# Patient Record
Sex: FEMALE | Race: WHITE | NOT HISPANIC OR LATINO | Employment: OTHER | URBAN - METROPOLITAN AREA
[De-identification: names, ages, dates, MRNs, and addresses within clinical notes are randomized per-mention and may not be internally consistent; named-entity substitution may affect disease eponyms.]

---

## 2017-03-01 ENCOUNTER — HOSPITAL ENCOUNTER (OUTPATIENT)
Dept: LAB | Facility: CLINIC | Age: 47
Discharge: HOME | End: 2017-03-01
Attending: INTERNAL MEDICINE | Admitting: INTERNAL MEDICINE
Payer: MEDICARE

## 2017-03-01 DIAGNOSIS — G47.00 INSOMNIA, UNSPECIFIED TYPE: ICD-10-CM

## 2017-03-01 DIAGNOSIS — G62.9 NEUROPATHY: ICD-10-CM

## 2017-03-01 DIAGNOSIS — Z85.3 PERSONAL HISTORY OF MALIGNANT NEOPLASM OF BREAST: ICD-10-CM

## 2017-03-01 LAB
ALBUMIN SERPL-MCNC: 3.2 G/DL (ref 3.4–5)
ALP SERPL-CCNC: 100 U/L (ref 40–150)
ALT SERPL W P-5'-P-CCNC: 39 U/L (ref 0–50)
ANION GAP SERPL CALCULATED.3IONS-SCNC: 7 MMOL/L (ref 3–14)
AST SERPL W P-5'-P-CCNC: 25 U/L (ref 0–45)
BASOPHILS # BLD AUTO: 0 10E9/L (ref 0–0.2)
BASOPHILS NFR BLD AUTO: 0.4 %
BILIRUB SERPL-MCNC: 0.3 MG/DL (ref 0.2–1.3)
BUN SERPL-MCNC: 8 MG/DL (ref 7–30)
CALCIUM SERPL-MCNC: 8.4 MG/DL (ref 8.5–10.1)
CANCER AG27-29 SERPL-ACNC: 21 U/ML (ref 0–39)
CHLORIDE SERPL-SCNC: 111 MMOL/L (ref 94–109)
CO2 SERPL-SCNC: 26 MMOL/L (ref 20–32)
CREAT SERPL-MCNC: 0.54 MG/DL (ref 0.52–1.04)
DIFFERENTIAL METHOD BLD: NORMAL
EOSINOPHIL # BLD AUTO: 0.1 10E9/L (ref 0–0.7)
EOSINOPHIL NFR BLD AUTO: 2.2 %
ERYTHROCYTE [DISTWIDTH] IN BLOOD BY AUTOMATED COUNT: 12.8 % (ref 10–15)
GFR SERPL CREATININE-BSD FRML MDRD: ABNORMAL ML/MIN/1.7M2
GLUCOSE SERPL-MCNC: 89 MG/DL (ref 70–99)
HCT VFR BLD AUTO: 38.8 % (ref 35–47)
HGB BLD-MCNC: 12.5 G/DL (ref 11.7–15.7)
IMM GRANULOCYTES # BLD: 0.2 10E9/L (ref 0–0.4)
IMM GRANULOCYTES NFR BLD: 3.3 %
LYMPHOCYTES # BLD AUTO: 0.8 10E9/L (ref 0.8–5.3)
LYMPHOCYTES NFR BLD AUTO: 17.4 %
MCH RBC QN AUTO: 32.1 PG (ref 26.5–33)
MCHC RBC AUTO-ENTMCNC: 32.2 G/DL (ref 31.5–36.5)
MCV RBC AUTO: 100 FL (ref 78–100)
MONOCYTES # BLD AUTO: 0.5 10E9/L (ref 0–1.3)
MONOCYTES NFR BLD AUTO: 10.7 %
NEUTROPHILS # BLD AUTO: 3 10E9/L (ref 1.6–8.3)
NEUTROPHILS NFR BLD AUTO: 66 %
PLATELET # BLD AUTO: 197 10E9/L (ref 150–450)
POTASSIUM SERPL-SCNC: 3.6 MMOL/L (ref 3.4–5.3)
PROT SERPL-MCNC: 6.7 G/DL (ref 6.8–8.8)
RBC # BLD AUTO: 3.89 10E12/L (ref 3.8–5.2)
SODIUM SERPL-SCNC: 144 MMOL/L (ref 133–144)
WBC # BLD AUTO: 4.5 10E9/L (ref 4–11)

## 2017-03-01 PROCEDURE — 80053 COMPREHEN METABOLIC PANEL: CPT | Performed by: INTERNAL MEDICINE

## 2017-03-01 PROCEDURE — 86300 IMMUNOASSAY TUMOR CA 15-3: CPT | Performed by: INTERNAL MEDICINE

## 2017-03-01 PROCEDURE — 85025 COMPLETE CBC W/AUTO DIFF WBC: CPT | Performed by: INTERNAL MEDICINE

## 2017-03-01 PROCEDURE — 36415 COLL VENOUS BLD VENIPUNCTURE: CPT | Performed by: INTERNAL MEDICINE

## 2017-03-01 RX ORDER — IOPAMIDOL 755 MG/ML
63 INJECTION, SOLUTION INTRAVASCULAR ONCE
Status: COMPLETED | OUTPATIENT
Start: 2017-03-02 | End: 2017-03-02

## 2017-03-02 ENCOUNTER — HOSPITAL ENCOUNTER (OUTPATIENT)
Dept: CT IMAGING | Facility: CLINIC | Age: 47
Discharge: HOME | End: 2017-03-02
Attending: INTERNAL MEDICINE | Admitting: INTERNAL MEDICINE
Payer: MEDICARE

## 2017-03-02 DIAGNOSIS — Z85.3 PERSONAL HISTORY OF MALIGNANT NEOPLASM OF BREAST: ICD-10-CM

## 2017-03-02 DIAGNOSIS — G47.00 INSOMNIA, UNSPECIFIED TYPE: ICD-10-CM

## 2017-03-02 DIAGNOSIS — G62.9 NEUROPATHY: ICD-10-CM

## 2017-03-02 PROCEDURE — 25000125 ZZHC RX 250: Performed by: RADIOLOGY

## 2017-03-02 PROCEDURE — 74177 CT ABD & PELVIS W/CONTRAST: CPT

## 2017-03-02 PROCEDURE — 25500064 ZZH RX 255 OP 636: Performed by: RADIOLOGY

## 2017-03-02 PROCEDURE — 71260 CT THORAX DX C+: CPT

## 2017-03-02 RX ADMIN — SODIUM CHLORIDE 56 ML: 9 INJECTION, SOLUTION INTRAVENOUS at 15:39

## 2017-03-02 RX ADMIN — IOPAMIDOL 63 ML: 755 INJECTION, SOLUTION INTRAVENOUS at 15:39

## 2017-03-03 DIAGNOSIS — R23.2 HOT FLASHES: ICD-10-CM

## 2017-03-03 RX ORDER — VENLAFAXINE HYDROCHLORIDE 75 MG/1
75 CAPSULE, EXTENDED RELEASE ORAL DAILY
Qty: 90 CAPSULE | Refills: 3 | Status: SHIPPED | OUTPATIENT
Start: 2017-03-03 | End: 2017-07-10 | Stop reason: DRUGHIGH

## 2017-03-03 NOTE — TELEPHONE ENCOUNTER
Last office visit 8/31/15  Future appointment with OB/GYN not currently scheduled.    Routing refill request to provider for review/approval because:  Suzan given x1 and patient did not follow up, please advise    Sunni York   Ob/Gyn Clinic  RN

## 2017-03-06 ENCOUNTER — ONCOLOGY VISIT (OUTPATIENT)
Dept: ONCOLOGY | Facility: CLINIC | Age: 47
End: 2017-03-06
Attending: INTERNAL MEDICINE
Payer: MEDICARE

## 2017-03-06 VITALS
DIASTOLIC BLOOD PRESSURE: 63 MMHG | WEIGHT: 122.8 LBS | TEMPERATURE: 98.2 F | RESPIRATION RATE: 14 BRPM | BODY MASS INDEX: 23.18 KG/M2 | HEART RATE: 81 BPM | HEIGHT: 61 IN | SYSTOLIC BLOOD PRESSURE: 84 MMHG

## 2017-03-06 DIAGNOSIS — R93.89 ABNORMAL CT SCAN: ICD-10-CM

## 2017-03-06 DIAGNOSIS — G62.9 NEUROPATHY: ICD-10-CM

## 2017-03-06 DIAGNOSIS — G47.00 INSOMNIA, UNSPECIFIED TYPE: ICD-10-CM

## 2017-03-06 DIAGNOSIS — Z85.3 PERSONAL HISTORY OF MALIGNANT NEOPLASM OF BREAST: Primary | ICD-10-CM

## 2017-03-06 PROCEDURE — 99214 OFFICE O/P EST MOD 30 MIN: CPT | Performed by: INTERNAL MEDICINE

## 2017-03-06 PROCEDURE — 99211 OFF/OP EST MAY X REQ PHY/QHP: CPT

## 2017-03-06 ASSESSMENT — PAIN SCALES - GENERAL: PAINLEVEL: MODERATE PAIN (5)

## 2017-03-06 NOTE — PROGRESS NOTES
"ONCOLOGY FOLLOW UP VISIT     BREAST SURGEON:  Dr. Bell Ulrich, Breast Surgeon at [facility].      REASON FOR CONSULTATION AND REASON FOR VISIT:  left breast cancer 2015, ER/VT-, Her 2 low +, s/p neoadjuvant chemo      HISTORY OF PRESENT ILLNESS: She presented with a palpable mass in the left breast for 3 weeks at age 45.  Diagnostic mammogram in 06/2015 identified a 3 cm mass in the upper outer quadrant of the left breast.  This is around at 1 o'clock position, 9 cm from the nipple.  Biopsy indicating ER/VT negative, HER-2 low positive, FISH ratio 2.2, copy number was 3.7.      Staging PET found hypermetabolic adenopathy in the mediastinum, bilateral hilar regions, left lateral posterior nasopharynx and oropharynx, small left axilla LN. EBUS by [facility] 8/2015 found non-necrotizing granulomas and negative for malignancy.     She was seen by Dr. Rodriguez and recommended neoadjuvant AC with wkly taxol , C1 AC 8/17/2015. Dr. Rodriguez requested further Her 2 testing at [place], who read is as \"+\".      Wkly T taxol 80 mg/m2, Pertuzumab and herceptin q 3 wks are offered after DD AC. She had severe diarrhea and hypkalemia, Pertuzumab was dropped after 2 cycles. She finished total 12 wks of wkly taxol and herceptin.     She had Left lumpectomy 3/1/2016 at [facility] to have 0.7 cm residual high grade IDC, TN, clear margin, 3 LNs are negative. Adjuvant herceptin is offered for 1 yr till 3/2016. S/p RT till 5/2016.       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  She subsequently had knee problems on the left side, eventually required amputation around 2011 through different steps of surgery.  Reflux disease, chronic pain syndrome, moderate major depression.      MEDICATIONS:  Reviewed in Epic system.      SOCIAL HISTORY:  She lives in [place].  She moved from [place] to [place] than from [place] to [place] close to her family.  She has her personal care agent.  She is " "not .  She has never been pregnant and she has no kids.  She lives by herself in an apartment.      FAMILY HISTORY:  No family history of breast or ovarian cancer or other cancer on the mother's side.  Father's side history is unreliable.      REVIEW OF SYSTEMS:   Feels fine, persistent neuropathy on toes.   RHEUMATOLOGY/MUSCULOSKELETAL SYSTEM:  Above knee amputation of the left leg.  Chronic pain syndrome.      PHYSICAL EXAMINATION:   GENERAL APPEARANCE:  A middle-aged woman, looks like her stated age.  She is very on top of her medical history.   VITAL SIGNS:  Blood pressure (!) 84/63, pulse 81, temperature 98.2  F (36.8  C), temperature source Tympanic, resp. rate 14, height 1.549 m (5' 1\"), weight 55.7 kg (122 lb 12.8 oz), last menstrual period 08/07/2012, not currently breastfeeding.  HEENT: The patient is normocephalic, atraumatic. Pupils are equally reactive to light.  Sclerae are anicteric.  Moist oral mucosa.  Negative pharynx.  No oral thrush.   NECK:  Supple.  No jugular venous distention.  Thyroid is not palpable.   LYMPH NODES:  Superficial lymphadenopathy is not appreciable in the bilateral cervical, supraclavicular, axillary or inguinal areas.   CARDIOVASCULAR:  S1, S2 regular with no murmurs or gallops.  No carotid or abdominal bruits.   PULMONARY:  Lungs are clear to auscultation and percussion bilaterally.  There is no wheezing or rhonchi.   GASTROINTESTINAL:  Abdomen is soft, nontender.  No hepatosplenomegaly.  No signs of ascites.  No mass appreciable.   MUSCULOSKELETAL/EXTREMITIES:  Left above the knee amputation for osteosarcoma  NEUROLOGIC:  Cranial nerves II-XII are grossly intact.  Sensation intact.  Muscle strength and muscle tone symmetrical, 5/5 throughout.   BACK:  No spinal or paraspinal tenderness.  No CVA tenderness.   SKIN:  No petechiae.  No rash.  No signs of cellulitis. Diffuse dry skin.  BREASTS: left breast thicken area around 1 o'clock position, axis 4-5 cm from nipple, " well healed lumpectomy scar wihtout edema or ecchymoses.        CURRENT LABORATORY DATA:    3/2017 cbc diff/LFT/marker are fine/    Current Image  CT body 2/2017: There is worsening or mediastinal and hilar adenopathy.  There also is a new mildly prominent gastrohepatic ligament lymph node.  MA 9/2016 from [facility]: negative.     OLD DATA REVIEW WITH SUMMARY  8/2015 EBUS at [facility] biopsy of station 4R, 11L, 7 - none malignancy, non-necrotizing granulomas.    BCRA 1/2 mutation from [facility] 10/2015: negative.     7/2015 [id number] baseline 52  Cbc diff/CMP are good.       PET 7/2015  1. Focal asymmetric hypermetabolism at the left lateral posterior nasopharynx and oropharynx, series 102 image 36 (SUV Max 9.4). There is no conspicuous mass lesion seen at this location.  2. Hypermetabolic adenopathy in the mediastinum, bilateral hilar regions, and suspected at the left axilla.   1)Small left axillary lymph node shows mild hypermetabolism and measures 1.6 x 1.0 cm series 3 image 104 (SUV Max 2.8). A few adjacent left axillary small lymph nodes has minimal elevated metabolism.  2) Left hilar adenopathy measures 1.3 x 0.8 cm, series 3 image 109 (SUV Max 9.3). Example of a right hilar adenopathy measures 1.7 x 1.1 cm, series 3 image 112 (SUV Max 11.3).   3) Example of mediastinal adenopathy at the subcarinal location measures 2.0 x 0.9 cm, series 3 image 115 (SUV Max 8.4). There are numerous additional examples.  3. Left lateral breast mass is 3.2 x 3.0 cm, series 3 image 103 (SUV Max 16.4). Trace pericardial fluid.  4. Hazy ground-glass opacities and some interstitial prominence could represent pulmonary edema or other alveolar infiltrate. There is a small pericardial effusion.         ASSESSMENT AND PLAN:   1. She is diagnosed clinically T2 N1 MX  ER/[place] -, Her 2 low + left breast upper outer quadrant of the left breast high-grade invasive ductal cancer 6/2015 s/p neoadjuvant S/p DD AC -- wkly taxol and herceptin partial pertuzumab  (dropped due to severe diarrhea).   She had Left lumpectomy 3/1/2016 at [facility] to have 0.7 cm residual high grade IDC, TN, clear margin, 3 LNs are negative. Adjuvant herceptin is offered for 1 yr till 3/2016. S/p RT till 5/2016.     She needs close follow up q 4 months for now.     Early diagnosis breast cancer younger than 50 years old.  She saw genetic at [facility] 8/25/2015, BRCA 1/2 tests was negative.    2. On going neuropathy on toes. She is on gabapentin.     3. ROSSY mediastinum, bilateral hilar regions, left lateral posterior nasopharynx and oropharynx, small left axilla LN. EBUS by [facility] 8/2015 found non-necrotizing granulomas.   CT 3/2017 found worsening ROSSY in chest.   Advice pulmonary evaluation.     4.  Insomnia. She is on amitriptyline and gabapentin.

## 2017-03-06 NOTE — NURSING NOTE
"Paola Alicea is a 46 year old female who presents for:  Chief Complaint   Patient presents with     Oncology Clinic Visit     f/u breast cancer and review CT and lab results        Initial Vitals:  BP (!) 84/63 (BP Location: Right arm, Patient Position: Chair, Cuff Size: Adult Large)  Pulse 81  Temp 98.2  F (36.8  C) (Tympanic)  Resp 14  Ht 1.549 m (5' 1\")  Wt 55.7 kg (122 lb 12.8 oz)  LMP 08/07/2012  BMI 23.2 kg/m2 Estimated body mass index is 23.2 kg/(m^2) as calculated from the following:    Height as of this encounter: 1.549 m (5' 1\").    Weight as of this encounter: 55.7 kg (122 lb 12.8 oz).. Body surface area is 1.55 meters squared. BP completed using cuff size: large  Moderate Pain (5) Patient's last menstrual period was 08/07/2012. Allergies and medications reviewed.     Medications: Medication refills not needed today.  Pharmacy name entered into [facility]: [id number] - [address]    Comments: Patient presents today in f/u of breast cancer and to review results.  Has hypotension 84/63, denies lightheadedness or dizziness.    7 minutes for nursing intake (face to face time)   Anisa Wu RN        "

## 2017-03-06 NOTE — MR AVS SNAPSHOT
After Visit Summary   3/6/2017    Paola Alicea    MRN: [id number]           Patient Information     Date Of Birth          1970        Visit Information        Provider Department      3/6/2017 2:15 PM Kelly Thomas MD [facility]        Today's Diagnoses     Personal history of malignant neoplasm of breast    -  1    Insomnia, unspecified type        Neuropathy (H)        Abnormal CT scan          Care Instructions    Dr. Thomas is referring you to Pulmonology for known We would like to see you back in 4 months with labs. When you are in need of a refill, please call your pharmacy and they will send us a request.  Copy of appointments, and after visit summary (AVS) given to patient.  If you have any questions please call Elvira Márquez RN, BSN, OCN Oncology Hematology  [facility] [phone]. For questions after business hours, or on holidays/weekends, please call our after hours Nurse Triage line [phone]. Thank you.           Follow-ups after your visit        Your next 10 appointments already scheduled     Jul 05, 2017 10:10 AM CDT   LAB with [facility] ([facility])    [address] [address] [phone]           Patient must bring picture ID.  Patient should be prepared to give a urine specimen  Please do not eat 10-12 hours before your appointment if you are coming in fasting for labs on lipids, cholesterol, or glucose (sugar).  Pregnant women should follow their Care Team instructions. Water with medications is okay. Do not drink coffee or other fluids.   If you have concerns about taking  your medications, please ask at office or if scheduling via [employer], send a message by clicking on Secure Messaging, Message Your Care Team.            Jul 10, 2017 11:30 AM CDT   Return Visit with Kelly Thomas MD   [facility] ([facility])    [facility]  "[facility]  [address]  [facility] [place] [facility] [place] [address]   [phone]              Future tests that were ordered for you today     Open Future Orders        Priority Expected Expires Ordered    Ca27.29  breast tumor marker Routine 7/1/2017 8/31/2017 3/6/2017    Comprehensive metabolic panel Routine 7/1/2017 8/31/2017 3/6/2017    CBC with platelets differential Routine 7/1/2017 8/31/2017 3/6/2017            Who to contact     If you have questions or need follow up information about today's clinic visit or your schedule please contact [facility] directly at [phone].  Normal or non-critical lab and imaging results will be communicated to you by MyChart, letter or phone within 4 business days after the clinic has received the results. If you do not hear from us within 7 days, please contact the clinic through MyChart or phone. If you have a critical or abnormal lab result, we will notify you by phone as soon as possible.  Submit refill requests through [email or web address] or call your pharmacy and they will forward the refill request to us. Please allow 3 business days for your refill to be completed.          Additional Information About Your Visit        Care EveryWhere ID     This is your Care EveryWhere ID. This could be used by other organizations to access your [place] medical records  [id number]        Your Vitals Were     Pulse Temperature Respirations Height Last Period BMI (Body Mass Index)    81 98.2  F (36.8  C) (Tympanic) 14 1.549 m (5' 1\") 08/07/2012 23.2 kg/m2       Blood Pressure from Last 3 Encounters:   03/06/17 (!) 84/63   12/12/16 96/69   09/27/16 91/69    Weight from Last 3 Encounters:   03/06/17 55.7 kg (122 lb 12.8 oz)   12/12/16 58.7 kg (129 lb 6.4 oz)   09/27/16 62.8 kg (138 lb 8 oz)               Primary Care Provider Office Phone # Fax #    Ibeth Steiner [phone]       [facility] [address] [address]        Thank you!  "    Thank you for choosing [facility]  for your care. Our goal is always to provide you with excellent care. Hearing back from our patients is one way we can continue to improve our services. Please take a few minutes to complete the written survey that you may receive in the mail after your visit with us. Thank you!             Your Updated Medication List - Protect others around you: Learn how to safely use, store and throw away your medicines at www.disposemymeds.org.          This list is accurate as of: 3/6/17  2:49 PM.  Always use your most recent med list.                   Brand Name Dispense Instructions for use    amitriptyline 50 MG tablet    ELAVIL     Take 50 mg by mouth       ascorbic acid 1000 MG Tabs    vitamin C     Take 8 oz by mouth       FLECTOR 1.3 % Patch   Generic drug:  diclofenac          ibuprofen 600 MG tablet    ADVIL/MOTRIN         metoprolol 12.5 MG Tabs half-tab    TOPROL-XL     Take 12.5 mg by mouth daily       mirtazapine 15 MG tablet    REMERON         NEURONTIN 300 MG capsule   Generic drug:  gabapentin      Take 900 mg by mouth 2 times daily       omeprazole 20 MG CR capsule    priLOSEC     Take 20 mg by mouth daily.       [place] [facility]      [place] for home use. Length of need: 99       sertraline 100 MG tablet    ZOLOFT         venlafaxine 75 MG 24 hr capsule    EFFEXOR-XR    90 capsule    Take 1 capsule (75 mg) by mouth daily Annual visit is due - additional refills at clinic appointment.       Vitamin B-12 1000 MCG/15ML Liqd      Take 8 oz by mouth 2 times daily       Walker Auto Glides Misc      4 Wheeled Walker with Seat and Brakes for home use. For indefinite use

## 2017-03-08 ENCOUNTER — TELEPHONE (OUTPATIENT)
Dept: ONCOLOGY | Facility: CLINIC | Age: 47
End: 2017-03-08

## 2017-03-27 ENCOUNTER — PRE VISIT (OUTPATIENT)
Dept: PULMONOLOGY | Facility: CLINIC | Age: 47
End: 2017-03-27

## 2017-03-27 ENCOUNTER — TELEPHONE (OUTPATIENT)
Dept: PULMONOLOGY | Facility: CLINIC | Age: 47
End: 2017-03-27

## 2017-03-27 NOTE — TELEPHONE ENCOUNTER
1.  Date/reason for appt: 4/6/17 at 11 AM - Abnormal CT, Known Non-necrotizing Granulomas Disease S/p EBUS by [facility] 8/2015, now worse on CT 3/2017  2.  Referring provider: Kelly Thomas   3.  Call to patient (Yes / No - short description): no, recs in epic  4.  Previous care at:   - [facility] (Dr. Thomas) - in Psychiatric   - [facility] 3/2/17 Chest CT    - [facility] (Endobronchial Ultrsound 8/6/15 procedure and path results are scanned in epic) - fax sent to [facility] for any chest images

## 2017-03-27 NOTE — TELEPHONE ENCOUNTER
Pulmonary Nodule Conference      Patient Name: Paola Alicea    Reason for conference discussion (brief overview): 46 year old female with hx of ER/ID negative, her2 low psotive breast cancer with hx of LAD s/p EBUS at outside facility demonstrating adequate tissue w/o evidence of malignancy but non-necrotizing granulomas and negative cultures. New CT with new lymph nodes and larger previously noted PET + lymph nodes which demonstrated the granulomas.     Specific Question:  Would a repeat EBUS biopsy be recommended for possible false negative ?    Pertinent Histology:  See care everywhere. Non-necrotizing granulomas.     Referring Physician: MIRZA Thomas    The patient's case was presented at the multidisciplinary conference for the above noted reason.        There was a consensus recommendation for the following actions:     Technically the lymph nodes could be biopsied but the granulomas explain the findings on the imaging and we do not feel strongly that a repeat biopsy is needed as the granulomas seen on the previous biopsy explains the findings on the most recent imaging.  If repeat biopsy is strongly desired by oncology we can schedule for biopsy,.     Case Lead:  Elisa Carter and Hever Damian    Interventional Radiology Staff Present: N/A

## 2017-07-05 ENCOUNTER — HOSPITAL ENCOUNTER (OUTPATIENT)
Dept: LAB | Facility: CLINIC | Age: 47
Discharge: HOME | End: 2017-07-05
Attending: INTERNAL MEDICINE | Admitting: INTERNAL MEDICINE
Payer: MEDICARE

## 2017-07-05 ENCOUNTER — OFFICE VISIT (OUTPATIENT)
Dept: OBGYN | Facility: CLINIC | Age: 47
End: 2017-07-05
Payer: MEDICARE

## 2017-07-05 VITALS
WEIGHT: 125 LBS | BODY MASS INDEX: 23.6 KG/M2 | DIASTOLIC BLOOD PRESSURE: 73 MMHG | HEIGHT: 61 IN | HEART RATE: 80 BPM | SYSTOLIC BLOOD PRESSURE: 108 MMHG

## 2017-07-05 DIAGNOSIS — Z85.3 PERSONAL HISTORY OF MALIGNANT NEOPLASM OF BREAST: ICD-10-CM

## 2017-07-05 DIAGNOSIS — Z01.419 ENCOUNTER FOR GYNECOLOGICAL EXAMINATION WITHOUT ABNORMAL FINDING: Primary | ICD-10-CM

## 2017-07-05 LAB
ALBUMIN SERPL-MCNC: 3.4 G/DL (ref 3.4–5)
ALP SERPL-CCNC: 75 U/L (ref 40–150)
ALT SERPL W P-5'-P-CCNC: 21 U/L (ref 0–50)
ANION GAP SERPL CALCULATED.3IONS-SCNC: 8 MMOL/L (ref 3–14)
AST SERPL W P-5'-P-CCNC: 18 U/L (ref 0–45)
BASOPHILS # BLD AUTO: 0 10E9/L (ref 0–0.2)
BASOPHILS NFR BLD AUTO: 0.3 %
BILIRUB SERPL-MCNC: 0.3 MG/DL (ref 0.2–1.3)
BUN SERPL-MCNC: 16 MG/DL (ref 7–30)
CALCIUM SERPL-MCNC: 8.7 MG/DL (ref 8.5–10.1)
CANCER AG27-29 SERPL-ACNC: 18 U/ML (ref 0–39)
CHLORIDE SERPL-SCNC: 111 MMOL/L (ref 94–109)
CO2 SERPL-SCNC: 24 MMOL/L (ref 20–32)
CREAT SERPL-MCNC: 0.53 MG/DL (ref 0.52–1.04)
DIFFERENTIAL METHOD BLD: ABNORMAL
EOSINOPHIL # BLD AUTO: 0.1 10E9/L (ref 0–0.7)
EOSINOPHIL NFR BLD AUTO: 1.3 %
ERYTHROCYTE [DISTWIDTH] IN BLOOD BY AUTOMATED COUNT: 12.1 % (ref 10–15)
GFR SERPL CREATININE-BSD FRML MDRD: ABNORMAL ML/MIN/1.7M2
GLUCOSE SERPL-MCNC: 87 MG/DL (ref 70–99)
HCT VFR BLD AUTO: 41 % (ref 35–47)
HGB BLD-MCNC: 13.5 G/DL (ref 11.7–15.7)
IMM GRANULOCYTES # BLD: 0 10E9/L (ref 0–0.4)
IMM GRANULOCYTES NFR BLD: 0 %
LYMPHOCYTES # BLD AUTO: 1 10E9/L (ref 0.8–5.3)
LYMPHOCYTES NFR BLD AUTO: 25.5 %
MCH RBC QN AUTO: 31.3 PG (ref 26.5–33)
MCHC RBC AUTO-ENTMCNC: 32.9 G/DL (ref 31.5–36.5)
MCV RBC AUTO: 95 FL (ref 78–100)
MONOCYTES # BLD AUTO: 0.5 10E9/L (ref 0–1.3)
MONOCYTES NFR BLD AUTO: 13.4 %
NEUTROPHILS # BLD AUTO: 2.3 10E9/L (ref 1.6–8.3)
NEUTROPHILS NFR BLD AUTO: 59.5 %
PLATELET # BLD AUTO: 175 10E9/L (ref 150–450)
POTASSIUM SERPL-SCNC: 3.8 MMOL/L (ref 3.4–5.3)
PROT SERPL-MCNC: 7 G/DL (ref 6.8–8.8)
RBC # BLD AUTO: 4.32 10E12/L (ref 3.8–5.2)
SODIUM SERPL-SCNC: 143 MMOL/L (ref 133–144)
WBC # BLD AUTO: 3.8 10E9/L (ref 4–11)

## 2017-07-05 PROCEDURE — 85025 COMPLETE CBC W/AUTO DIFF WBC: CPT | Performed by: INTERNAL MEDICINE

## 2017-07-05 PROCEDURE — 86300 IMMUNOASSAY TUMOR CA 15-3: CPT | Performed by: INTERNAL MEDICINE

## 2017-07-05 PROCEDURE — 99396 PREV VISIT EST AGE 40-64: CPT | Performed by: OBSTETRICS & GYNECOLOGY

## 2017-07-05 PROCEDURE — 36415 COLL VENOUS BLD VENIPUNCTURE: CPT | Performed by: INTERNAL MEDICINE

## 2017-07-05 PROCEDURE — G0101 CA SCREEN;PELVIC/BREAST EXAM: HCPCS | Performed by: OBSTETRICS & GYNECOLOGY

## 2017-07-05 PROCEDURE — 80053 COMPREHEN METABOLIC PANEL: CPT | Performed by: INTERNAL MEDICINE

## 2017-07-05 NOTE — MR AVS SNAPSHOT
After Visit Summary   7/5/2017    Paola Alicea    MRN: [id number]           Patient Information     Date Of Birth          1970        Visit Information        Provider Department      7/5/2017 1:30 PM Britni Gil MD [facility]        Today's Diagnoses     Encounter for gynecological examination without abnormal finding    -  1      Care Instructions      Preventive Health Recommendations  Female Ages 40 to 49    Yearly exam:     See your health care provider every year in order to  1. Review health changes.   2. Discuss preventive care.    3. Review your medicines if your doctor prescribed any.      Get a Pap test every three years (unless you have an abnormal result and your provider advises testing more often).      If you get Pap tests with HPV test, you only need to test every 5 years, unless you have an abnormal result. You do not need a Pap test if your uterus was removed (hysterectomy) and you have not had cancer.      You should be tested each year for STDs (sexually transmitted diseases), if you're at risk.       Ask your doctor if you should have a mammogram.      Have a colonoscopy (test for colon cancer) if someone in your family has had colon cancer or polyps before age 50.       Have a cholesterol test every 5 years.       Have a diabetes test (fasting glucose) after age 45. If you are at risk for diabetes, you should have this test every 3 years.    Shots: Get a flu shot each year. Get a tetanus shot every 10 years.     Nutrition:     Eat at least 5 servings of fruits and vegetables each day.    Eat whole-grain bread, whole-wheat pasta and brown rice instead of white grains and rice.    Talk to your provider about Calcium and Vitamin D.     Lifestyle    Exercise at least 150 minutes a week (an average of 30 minutes a day, 5 days a week). This will help you control your weight and prevent disease.    Limit alcohol to one drink per day.    No smoking.  "    Wear sunscreen to prevent skin cancer.    See your dentist every six months for an exam and cleaning.          Follow-ups after your visit        Your next 10 appointments already scheduled     Jul 10, 2017 11:30 AM CDT   Return Visit with Kelly Thomas MD   [facility] ([facility])    [facility]  [address]  [facility] [place] [address]   [phone]              Who to contact     If you have questions or need follow up information about today's clinic visit or your schedule please contact [facility] directly at [phone].  Normal or non-critical lab and imaging results will be communicated to you by [employer], letter or phone within 4 business days after the clinic has received the results. If you do not hear from us within 7 days, please contact the clinic through [employer] or phone. If you have a critical or abnormal lab result, we will notify you by phone as soon as possible.  Submit refill requests through [employer] or call your pharmacy and they will forward the refill request to us. Please allow 3 business days for your refill to be completed.          Additional Information About Your Visit        [employer] Products Information     Photoways lets you send messages to your doctor, view your test results, renew your prescriptions, schedule appointments and more. To sign up, go to [email or web address] . Click on \"Log in\" on the left side of the screen, which will take you to the Welcome page. Then click on \"Sign up Now\" on the right side of the page.     You will be asked to enter the access code listed below, as well as some personal information. Please follow the directions to create your username and password.     Your access code is: [email or web address]  Expires: 10/3/2017  1:42 PM     Your access code will  in 90 days. If you need help or a new code, please call your [facility] or [phone].        Care EveryWhere ID     This is your Care " "EveryWhere ID. This could be used by other organizations to access your [place] medical records  [id number]        Your Vitals Were     Pulse Height Last Period BMI (Body Mass Index)          80 5' 1\" (1.549 m) 08/07/2012 23.62 kg/m2         Blood Pressure from Last 3 Encounters:   07/05/17 108/73   03/06/17 (!) 84/63   12/12/16 96/69    Weight from Last 3 Encounters:   07/05/17 125 lb (56.7 kg)   03/06/17 122 lb 12.8 oz (55.7 kg)   12/12/16 129 lb 6.4 oz (58.7 kg)              Today, you had the following     No orders found for display       Primary Care Provider Office Phone # Fax #    Ibeth Beltran Steiner [phone]       [facility] [address] [address]        Equal Access to Services     [facility] Center[facility]ANNON : Hadii aad ku hadasho Sohermes, waaxda luqadaha, qaybta kaalmada adeegyada, darya wesleyin ash gifford . So [facility] [phone].    ATENCIÓN: Si habla español, tiene a aviles disposición servicios gratuitos de asistencia lingüística. Willi al [phone].    We comply with applicable federal civil rights laws and [place] laws. We do not discriminate on the basis of race, color, national origin, age, disability sex, sexual orientation or gender identity.            Thank you!     Thank you for choosing [facility]  for your care. Our goal is always to provide you with excellent care. Hearing back from our patients is one way we can continue to improve our services. Please take a few minutes to complete the written survey that you may receive in the mail after your visit with us. Thank you!             Your Updated Medication List - Protect others around you: Learn how to safely use, store and throw away your medicines at www.disposemymeds.org.          This list is accurate as of: 7/5/17  1:42 PM.  Always use your most recent med list.                   Brand Name Dispense Instructions for use Diagnosis    amitriptyline 50 MG tablet    ELAVIL "     Take 50 mg by mouth        ascorbic acid 1000 MG Tabs    vitamin C     Take 8 oz by mouth        FLECTOR 1.3 % Patch   Generic drug:  diclofenac       Breast cancer, left (H)       ibuprofen 600 MG tablet    ADVIL/MOTRIN          metoprolol 12.5 mg Tabs half-tab    TOPROL-XL     Take 12.5 mg by mouth daily        mirtazapine 15 MG tablet    REMERON          NEURONTIN 300 MG capsule   Generic drug:  gabapentin      Take 900 mg by mouth 2 times daily        omeprazole 20 MG CR capsule    priLOSEC     Take 20 mg by mouth daily.        order for [facility]      Hospital bed with rails for home use. Length of need: 99    Breast cancer, left (H)       sertraline 100 MG tablet    ZOLOFT          venlafaxine 75 MG 24 hr capsule    EFFEXOR-XR    90 capsule    Take 1 capsule (75 mg) by mouth daily Annual visit is due - additional refills at clinic appointment.    Hot flashes       Vitamin B-12 1000 MCG/15ML Liqd      Take 8 oz by mouth 2 times daily        Walker Auto Glides Misc      4 Wheeled Walker with Seat and Brakes for home use. For indefinite use    Breast cancer, left (H)

## 2017-07-05 NOTE — PROGRESS NOTES
SUBJECTIVE:   CC: Paola Alicea is an 47 year old woman who presents for preventive health visit.   Follows with oncologist here and in [place]--currently in remission for both breast CA and LLE sarcoma;     Healthy Habits:    Do you get at least three servings of calcium containing foods daily (dairy, green leafy vegetables, etc.)? yes and no, taking calcium and/or vitamin D supplement: yes -     Amount of exercise or daily activities, outside of work: 3 day(s) per week    Problems taking medications regularly No    Medication side effects: No    Have you had an eye exam in the past two years? yes    Do you see a dentist twice per year? yes    Do you have sleep apnea, excessive snoring or daytime drowsiness?no          Today's PHQ-2 Score:   PHQ-2 ( 1999 Pfizer) 7/5/2017 12/12/2016   Q1: Little interest or pleasure in doing things 0 0   Q2: Feeling down, depressed or hopeless 0 0   PHQ-2 Score 0 0       Abuse: Current or Past(Physical, Sexual or Emotional)- No  Do you feel safe in your environment - Yes    Social History   Substance Use Topics     Smoking status: Never Smoker     Smokeless tobacco: Never Used     Alcohol use No     The patient does not drink >3 drinks per day nor >7 drinks per week.    Reviewed orders with patient.  Reviewed health maintenance and updated orders accordingly - Yes  Labs reviewed in EPIC    Alternate mammogram schedule due to breast cancer history every 6 months    Pertinent mammograms are reviewed under the imaging tab.  History of abnormal Pap smear: Status post benign hysterectomy. Health Maintenance and Surgical History updated.    Reviewed and updated as needed this visit by clinical staff  Tobacco  Allergies  Meds  Med Hx  Surg Hx  Fam Hx  Soc Hx        Reviewed and updated as needed this visit by Provider            ROS:  C: NEGATIVE for fever, chills, change in weight  I: NEGATIVE for worrisome rashes, moles or lesions  E: NEGATIVE for vision changes or  "irritation  ENT: NEGATIVE for ear, mouth and throat problems  R: NEGATIVE for significant cough or SOB  B: NEGATIVE for masses, tenderness or discharge  CV: NEGATIVE for chest pain, palpitations or peripheral edema  GI: NEGATIVE for nausea, abdominal pain, heartburn, or change in bowel habits  : NEGATIVE for unusual urinary or vaginal symptoms. No vaginal bleeding.  M: NEGATIVE for significant arthralgias or myalgia  N: NEGATIVE for weakness, dizziness or paresthesias  P: NEGATIVE for changes in mood or affect     OBJECTIVE:   /73 (BP Location: Right arm, Patient Position: Chair, Cuff Size: Adult Large)  Pulse 80  Ht 5' 1\" (1.549 m)  Wt 125 lb (56.7 kg)  LMP 08/07/2012  BMI 23.62 kg/m2  EXAM:  GENERAL APPEARANCE: healthy, alert and no distress  EYES: Eyes grossly normal to inspection, PERRL and conjunctivae and sclerae normal  HENT: ear canals and TM's normal, nose and mouth without ulcers or lesions, oropharynx clear and oral mucous membranes moist  NECK: no adenopathy, no asymmetry, masses, or scars and thyroid normal to palpation  RESP: lungs clear to auscultation - no rales, rhonchi or wheezes  BREAST: normal without masses, tenderness or nipple discharge and no palpable axillary masses or adenopathy  CV: regular rate and rhythm, normal S1 S2, no S3 or S4, no murmur, click or rub, no peripheral edema and peripheral pulses strong  ABDOMEN: soft, nontender, no hepatosplenomegaly, no masses and bowel sounds normal     - Ext: NEFG,       - Urethra: no  lesions, no  masses,  no hypermobility       - Urethral Meatus: normal appearance,       - Bladder: non  tenderness,  no masses       - Vagina: pink, moist, normal rugae, no  lesions,no  discharge       - Cervix:  no lesions, parous       - Uterus:  absent       - Adnexa:  no masses,  non tenderness       - Rectal: deferred, normal tone, no masses       - Pelvic support:  cystocele,  rectocele,  uterine prolapse     MS: LLE absent; surgical site clear; no " "adenopathy or masses  SKIN: no suspicious lesions or rashes  NEURO: Normal strength and tone, sensory exam grossly normal, mentation intact and speech normal  PSYCH: mentation appears normal and affect normal/bright    ASSESSMENT/PLAN:       ICD-10-CM    1. Encounter for gynecological examination without abnormal finding Z01.419        COUNSELING:   Reviewed preventive health counseling, as reflected in patient instructions  Special attention given to:        calcium and Vit D intake         reports that she has never smoked. She has never used smokeless tobacco.    Estimated body mass index is 23.62 kg/(m^2) as calculated from the following:    Height as of this encounter: 5' 1\" (1.549 m).    Weight as of this encounter: 125 lb (56.7 kg).       Counseling Resources:  ATP IV Guidelines  Pooled Cohorts Equation Calculator  Breast Cancer Risk Calculator  FRAX Risk Assessment  ICSI Preventive Guidelines  Dietary Guidelines for Americans, 2010  USDA's MyPlate  ASA Prophylaxis  Lung CA Screening    Britni Gil MD  [facility]  "

## 2017-07-05 NOTE — NURSING NOTE
"Initial /73 (BP Location: Right arm, Patient Position: Chair, Cuff Size: Adult Large)  Pulse 80  Ht 5' 1\" (1.549 m)  Wt 125 lb (56.7 kg)  LMP 08/07/2012  BMI 23.62 kg/m2 Estimated body mass index is 23.62 kg/(m^2) as calculated from the following:    Height as of this encounter: 5' 1\" (1.549 m).    Weight as of this encounter: 125 lb (56.7 kg). .      "

## 2017-07-10 ENCOUNTER — ONCOLOGY VISIT (OUTPATIENT)
Dept: ONCOLOGY | Facility: CLINIC | Age: 47
End: 2017-07-10
Attending: INTERNAL MEDICINE
Payer: MEDICARE

## 2017-07-10 VITALS
WEIGHT: 127.4 LBS | DIASTOLIC BLOOD PRESSURE: 69 MMHG | HEART RATE: 77 BPM | HEIGHT: 61 IN | TEMPERATURE: 98.6 F | OXYGEN SATURATION: 100 % | RESPIRATION RATE: 18 BRPM | BODY MASS INDEX: 24.05 KG/M2 | SYSTOLIC BLOOD PRESSURE: 103 MMHG

## 2017-07-10 DIAGNOSIS — G62.9 NEUROPATHY: ICD-10-CM

## 2017-07-10 DIAGNOSIS — D72.819 LEUKOPENIA, UNSPECIFIED TYPE: ICD-10-CM

## 2017-07-10 DIAGNOSIS — Z85.3 PERSONAL HISTORY OF MALIGNANT NEOPLASM OF BREAST: Primary | ICD-10-CM

## 2017-07-10 DIAGNOSIS — R93.89 ABNORMAL CT SCAN: ICD-10-CM

## 2017-07-10 DIAGNOSIS — G47.00 INSOMNIA, UNSPECIFIED TYPE: ICD-10-CM

## 2017-07-10 PROCEDURE — 99214 OFFICE O/P EST MOD 30 MIN: CPT | Performed by: INTERNAL MEDICINE

## 2017-07-10 PROCEDURE — 99211 OFF/OP EST MAY X REQ PHY/QHP: CPT

## 2017-07-10 RX ORDER — VENLAFAXINE HYDROCHLORIDE 150 MG/1
CAPSULE, EXTENDED RELEASE ORAL
Refills: 2 | COMMUNITY
Start: 2017-06-09 | End: 2018-11-13

## 2017-07-10 RX ORDER — 1.1% SODIUM FLUORIDE 11 MG/G
GEL DENTAL
Refills: 99 | COMMUNITY
Start: 2017-06-07

## 2017-07-10 RX ORDER — CELECOXIB 200 MG/1
CAPSULE ORAL
Refills: 3 | COMMUNITY
Start: 2017-06-09 | End: 2018-05-15

## 2017-07-10 RX ORDER — SUCRALFATE 1 G/1
TABLET ORAL
Refills: 0 | COMMUNITY
Start: 2017-06-09 | End: 2018-11-13

## 2017-07-10 ASSESSMENT — PAIN SCALES - GENERAL: PAINLEVEL: NO PAIN (0)

## 2017-07-10 NOTE — PROGRESS NOTES
"ONCOLOGY FOLLOW UP VISIT     BREAST SURGEON:  Dr. Bell Ulrich, Breast Surgeon at [facility].      REASON FOR CONSULTATION AND REASON FOR VISIT:  left breast cancer 2015, ER/AR-, Her 2 low +, s/p neoadjuvant chemo      HISTORY OF PRESENT ILLNESS: She presented with a palpable mass in the left breast for 3 weeks at age 45.  Diagnostic mammogram in 06/2015 identified a 3 cm mass in the upper outer quadrant of the left breast.  This is around at 1 o'clock position, 9 cm from the nipple.  Biopsy indicating ER/AR negative, HER-2 low positive, FISH ratio 2.2, copy number was 3.7.      Staging PET found hypermetabolic adenopathy in the mediastinum, bilateral hilar regions, left lateral posterior nasopharynx and oropharynx, small left axilla LN. EBUS by [facility] 8/2015 found non-necrotizing granulomas and negative for malignancy.     She was seen by Dr. Rodriguez and recommended neoadjuvant AC with wkly taxol , C1 AC 8/17/2015. Dr. Rodriguez requested further Her 2 testing at [place], who read is as \"+\".      Wkly T taxol 80 mg/m2, Pertuzumab and herceptin q 3 wks are offered after DD AC. She had severe diarrhea and hypkalemia, Pertuzumab was dropped after 2 cycles. She finished total 12 wks of wkly taxol and herceptin.     She had Left lumpectomy 3/1/2016 at [facility] to have 0.7 cm residual high grade IDC, TN, clear margin, 3 LNs are negative. Adjuvant herceptin is offered for 1 yr till 3/2016. S/p RT till 5/2016.       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  She subsequently had knee problems on the left side, eventually required amputation around 2011 through different steps of surgery.  Reflux disease, chronic pain syndrome, moderate major depression.      MEDICATIONS:  Reviewed in Epic system.      SOCIAL HISTORY:  She lives in [place].  She moved from [place] to [place] from [place] to [place] close to her family.  She has her personal care agent.  She is " "not .  She has never been pregnant and she has no kids.  She lives by herself in an apartment.   She is very involved with community work.      FAMILY HISTORY:  No family history of breast or ovarian cancer or other cancer on the mother's side.  Father's side history is unreliable.      REVIEW OF SYSTEMS:   Feels fine, persistent neuropathy on toes and left leg post amputation.   RHEUMATOLOGY/MUSCULOSKELETAL SYSTEM:  Above knee amputation of the left leg.  Chronic pain syndrome.      PHYSICAL EXAMINATION:   GENERAL APPEARANCE:  A middle-aged woman, looks like her stated age.  She is very on top of her medical history.   VITAL SIGNS:  Blood pressure 103/69, pulse 77, temperature 98.6  F (37  C), temperature source Tympanic, resp. rate 18, height 1.537 m (5' 0.5\"), weight 57.8 kg (127 lb 6.4 oz), last menstrual period 08/07/2012, SpO2 100 %, not currently breastfeeding.  HEENT: The patient is normocephalic, atraumatic. Pupils are equally reactive to light.  Sclerae are anicteric.  Moist oral mucosa.  Negative pharynx.  No oral thrush.   NECK:  Supple.  No jugular venous distention.  Thyroid is not palpable.   LYMPH NODES:  Superficial lymphadenopathy is not appreciable in the bilateral cervical, supraclavicular, axillary or inguinal areas.   CARDIOVASCULAR:  S1, S2 regular with no murmurs or gallops.  No carotid or abdominal bruits.   PULMONARY:  Lungs are clear to auscultation and percussion bilaterally.  There is no wheezing or rhonchi.   GASTROINTESTINAL:  Abdomen is soft, nontender.  No hepatosplenomegaly.  No signs of ascites.  No mass appreciable.   MUSCULOSKELETAL/EXTREMITIES:  Left above the knee amputation for osteosarcoma  NEUROLOGIC:  Cranial nerves II-XII are grossly intact.  Sensation intact.  Muscle strength and muscle tone symmetrical, 5/5 throughout.   BACK:  No spinal or paraspinal tenderness.  No CVA tenderness.   SKIN:  No petechiae.  No rash.  No signs of cellulitis. Diffuse dry " skin.  BREASTS: well healed lumpectomy scar wihtout edema or ecchymoses.        CURRENT LABORATORY DATA REVIEWED  7/2017 WBC 3.8, diff is fine, CMP is fine, marker is good.     3/2017 cbc diff/LFT/marker are fine/    Current Image Reviewed  CT body 2/2017: There is worsening or mediastinal and hilar adenopathy.  There also is a new mildly prominent gastrohepatic ligament lymph node.    MA 9/2016 from [place]: negative.     OLD DATA REVIEW WITH SUMMARY  8/2015 EBUS at [facility] biopsy of station 4R, 11L, 7 - none malignancy, non-necrotizing granulomas.    BCRA 1/2 mutation from [facility] 10/2015: negative.     7/2015 [id number] baseline 52  Cbc diff/CMP are good.       PET 7/2015  1. Focal asymmetric hypermetabolism at the left lateral posterior nasopharynx and oropharynx, series 102 image 36 (SUV Max 9.4). There is no conspicuous mass lesion seen at this location.  2. Hypermetabolic adenopathy in the mediastinum, bilateral hilar regions, and suspected at the left axilla.   1)Small left axillary lymph node shows mild hypermetabolism and measures 1.6 x 1.0 cm series 3 image 104 (SUV Max 2.8). A few adjacent left axillary small lymph nodes has minimal elevated metabolism.  2) Left hilar adenopathy measures 1.3 x 0.8 cm, series 3 image 109 (SUV Max 9.3). Example of a right hilar adenopathy measures 1.7 x 1.1 cm, series 3 image 112 (SUV Max 11.3).   3) Example of mediastinal adenopathy at the subcarinal location measures 2.0 x 0.9 cm, series 3 image 115 (SUV Max 8.4). There are numerous additional examples.  3. Left lateral breast mass is 3.2 x 3.0 cm, series 3 image 103 (SUV Max 16.4). Trace pericardial fluid.  4. Hazy ground-glass opacities and some interstitial prominence could represent pulmonary edema or other alveolar infiltrate. There is a small pericardial effusion.         ASSESSMENT AND PLAN:   1. She was diagnosed clinically T2 N1 MX  ER/[place] -, Her 2 low + left breast upper outer quadrant of the left breast high-grade  invasive ductal cancer 6/2015 s/p neoadjuvant S/p DD AC -- wkly taxol and herceptin partial pertuzumab (dropped due to severe diarrhea).   She had Left lumpectomy 3/1/2016 at [facility] to have 0.7 cm residual high grade IDC, TN, clear margin, 3 LNs are negative. Adjuvant herceptin is offered for 1 yr till 3/2016. S/p RT till 5/2016.     She needs follow up q 4 months for now.     Early diagnosis breast cancer younger than 50 years old.  She saw genetic at [facility] 8/25/2015, BRCA 1/2 tests was negative.    2. On going neuropathy on toes. She is on gabapentin.     3. ROSSY mediastinum, bilateral hilar regions, left lateral posterior nasopharynx and oropharynx, small left axilla LN. EBUS by [facility] 8/2015 found non-necrotizing granulomas.   CT 3/2017 found worsening ROSSY in chest.   Advice pulmonary evaluation. She is not compliant on this.     4.  Insomnia. She is on amitriptyline and gabapentin.      5. Mild leukopenia. Her ANC and ALC are fine, will f/u at this point.

## 2017-07-10 NOTE — NURSING NOTE
"Oncology Rooming Note    July 10, 2017 11:37 AM   Paola Alicea is a 47 year old female who presents for:    Chief Complaint   Patient presents with     Oncology Clinic Visit     4 month recheck Personal history of malignant neoplasm of breast, review labs      Initial Vitals: /69 (BP Location: Right arm, Patient Position: Sitting, Cuff Size: Adult Regular)  Pulse 77  Temp 98.6  F (37  C) (Tympanic)  Resp 18  Ht 1.537 m (5' 0.5\")  Wt 57.8 kg (127 lb 6.4 oz)  LMP 08/07/2012  SpO2 100%  Breastfeeding? No  BMI 24.47 kg/m2 Estimated body mass index is 24.47 kg/(m^2) as calculated from the following:    Height as of this encounter: 1.537 m (5' 0.5\").    Weight as of this encounter: 57.8 kg (127 lb 6.4 oz). Body surface area is 1.57 meters squared.  No Pain (0) Comment: Data Unavailable   Patient's last menstrual period was 08/07/2012.  Allergies reviewed: Yes  Medications reviewed: Yes    Medications: Medication refills not needed today.  Pharmacy name entered into [employer] [facility]: [id number] [address]    Clinical concerns:  4 month recheck Personal history of malignant neoplasm of breast, review labs     10  minutes for nursing intake (face to face time)     Brunilda Carter CMA              "

## 2017-07-10 NOTE — PATIENT INSTRUCTIONS
We would like to see you back in 4 months with labs prior.    Prescriptions have been sent to   [facility] - [place], [place] - [address] [facility]  [address] [place] [address]  [address]  [facility] [place] [address]  Phone: [phone] Fax: [phone]    When you are in need of a refill, please call your pharmacy and they will send us a request.  Copy of appointments, and after visit summary (AVS) given to patient.  If you have any questions please call Rubi Lacy RN, BSN Oncology Hematology  [facility] [phone]. For questions after business hours, or on holidays/weekends, please call our after hours Nurse Triage line [phone]. Thank you.             4 months f/u with labs

## 2017-07-10 NOTE — MR AVS SNAPSHOT
After Visit Summary   7/10/2017    Paola Alicea    MRN: [id number]           Patient Information     Date Of Birth          1970        Visit Information        Provider Department      7/10/2017 11:30 AM Kelly Thomas MD [facility]        Today's Diagnoses     Personal history of malignant neoplasm of breast    -  1    Insomnia, unspecified type        Neuropathy (H)        Abnormal CT scan        Leukopenia, unspecified type          Care Instructions    We would like to see you back in 4 months with labs prior.    Prescriptions have been sent to   [facility] - [place], [place] - [address] [facility]  [address] [facility] [facility]  [address]  [facility] [address]  Phone: [phone] Fax: [phone]    When you are in need of a refill, please call your pharmacy and they will send us a request.  Copy of appointments, and after visit summary (AVS) given to patient.  If you have any questions please call Rubi Lacy RN, BSN Oncology Hematology  [facility] [phone]. For questions after business hours, or on holidays/weekends, please call our after hours Nurse Triage line [phone]. Thank you.             4 months f/u with labs          Follow-ups after your visit        Your next 10 appointments already scheduled     Nov 07, 2017 10:10 AM CST   LAB with [facility] ([facility])    [address] [facility] [address] [phone]           Patient must bring picture ID.  Patient should be prepared to give a urine specimen  Please do not eat 10-12 hours before your appointment if you are coming in fasting for labs on lipids, cholesterol, or glucose (sugar).  Pregnant women should follow their Care Team instructions. Water with medications is okay. Do not drink coffee or other fluids.   If you have concerns about taking  your medications, please ask at office or if scheduling via  "[employer], send a message by clicking on Secure Messaging, Message Your Care Team.            Nov 14, 2017 10:15 AM CST   Return Visit with Kelly Thomas MD   [facility] ([facility])    [facility]  [address] [facility] [address]  [facility] [address]   [phone]              Future tests that were ordered for you today     Open Future Orders        Priority Expected Expires Ordered    Ca27.29  breast tumor marker Routine 11/1/2017 12/31/2017 7/10/2017    CBC with platelets differential Routine 11/1/2017 12/31/2017 7/10/2017    Comprehensive metabolic panel Routine 11/1/2017 12/31/2017 7/10/2017            Who to contact     If you have questions or need follow up information about today's clinic visit or your schedule please contact [facility] directly at [phone].  Normal or non-critical lab and imaging results will be communicated to you by [email or web address], letter or phone within 4 business days after the clinic has received the results. If you do not hear from us within 7 days, please contact the clinic through [email or web address] or phone. If you have a critical or abnormal lab result, we will notify you by phone as soon as possible.  Submit refill requests through [employer] or call your pharmacy and they will forward the refill request to us. Please allow 3 business days for your refill to be completed.          Additional Information About Your Visit        [email or web address] Information     [employer]ated [employer] lets you send messages to your doctor, view your test results, renew your prescriptions, schedule appointments and more. To sign up, go to [email or web address] . Click on \"Log in\" on the left side of the screen, which will take you to the Welcome page. Then click on \"Sign up Now\" on the right side of the page.     You will be asked to enter the access code listed below, as well as some personal information. Please follow the directions to create your username and password.     Your access " "code is: [email or web address]  Expires: 10/3/2017  1:42 PM     Your access code will  in 90 days. If you need help or a new code, please call your [facility] or [phone].        Care EveryWhere ID     This is your Care EveryWhere ID. This could be used by other organizations to access your [facility] medical records  [id number]        Your Vitals Were     Pulse Temperature Respirations Height Last Period Pulse Oximetry    77 98.6  F (37  C) (Tympanic) 18 1.537 m (5' 0.5\") 2012 100%    Breastfeeding? BMI (Body Mass Index)                No 24.47 kg/m2           Blood Pressure from Last 3 Encounters:   07/10/17 103/69   17 108/73   17 (!) 84/63    Weight from Last 3 Encounters:   07/10/17 57.8 kg (127 lb 6.4 oz)   17 56.7 kg (125 lb)   17 55.7 kg (122 lb 12.8 oz)                 Today's Medication Changes          These changes are accurate as of: 7/10/17 12:16 PM.  If you have any questions, ask your nurse or doctor.               These medicines have changed or have updated prescriptions.        Dose/Directions    venlafaxine 150 MG 24 hr capsule   Commonly known as:  EFFEXOR-XR   This may have changed:  Another medication with the same name was removed. Continue taking this medication, and follow the directions you see here.   Used for:  Personal history of malignant neoplasm of breast, Insomnia, unspecified type, Neuropathy (H), Abnormal CT scan   Changed by:  Kelly Thomas MD        TAKE 1 CAPSULE BY MOUTH ONCE DAILY WITH A MEAL   Refills:  2                Primary Care Provider Office Phone # Fax #    Ibeth Beltran Steiner [phone]       [place] [address] [facility] [address]        Equal Access to Services     LEONIDES CHAIREZ : Bacilio He, raine swanson, caryn caceres, darya osuna. [facility] [phone].    ATENCIÓN: Si habla español, tiene a aviles disposición servicios gratuitos de " wandy [email or web address]. Willi al [phone].    We comply with applicable federal civil rights laws and [place] laws. We do not discriminate on the basis of race, color, national origin, age, disability sex, sexual orientation or gender identity.            Thank you!     Thank you for choosing [facility]  for your care. Our goal is always to provide you with excellent care. Hearing back from our patients is one way we can continue to improve our services. Please take a few minutes to complete the written survey that you may receive in the mail after your visit with us. Thank you!             Your Updated Medication List - Protect others around you: Learn how to safely use, store and throw away your medicines at www.disposemymeds.org.          This list is accurate as of: 7/10/17 12:16 PM.  Always use your most recent med list.                   Brand Name Dispense Instructions for use Diagnosis    amitriptyline 50 MG tablet    ELAVIL     Take 50 mg by mouth        ascorbic acid 1000 MG Tabs    vitamin C     Take 8 oz by mouth        celecoxib 200 MG capsule    celeBREX     TAKE 1 CAPSULE BY MOUTH TWICE DAILY WITH MEALS    Personal history of malignant neoplasm of breast, Insomnia, unspecified type, Neuropathy (H), Abnormal CT scan       FLECTOR 1.3 % Patch   Generic drug:  diclofenac       Breast cancer, left (H)       ibuprofen 600 MG tablet    ADVIL/MOTRIN          metoprolol 12.5 mg Tabs half-tab    TOPROL-XL     Take 12.5 mg by mouth daily        mirtazapine 15 MG tablet    REMERON          NEURONTIN 300 MG capsule   Generic drug:  gabapentin      Take 900 mg by mouth 2 times daily        omeprazole 20 MG CR capsule    priLOSEC     Take 20 mg by mouth daily.        order for DME      Hospital bed with rails for home use. Length of need: 99    Breast cancer, left (H)       sertraline 100 MG tablet    ZOLOFT          SF 1.1 % Gel topical gel   Generic drug:  sodium fluoride dental gel      USE TO  BRUSH TEETH DAILY AT BEDTIME, EXPECTORATE-DO NOT RINSE.    Personal history of malignant neoplasm of breast, Insomnia, unspecified type, Neuropathy (H), Abnormal CT scan       sucralfate 1 GM tablet    CARAFATE     TAKE 1 TABLET BY MOUTH FOUR TIMES DAILY BEFORE MEALS AND AT BEDTIME    Personal history of malignant neoplasm of breast, Insomnia, unspecified type, Neuropathy (H), Abnormal CT scan       venlafaxine 150 MG 24 hr capsule    EFFEXOR-XR     TAKE 1 CAPSULE BY MOUTH ONCE DAILY WITH A MEAL    Personal history of malignant neoplasm of breast, Insomnia, unspecified type, Neuropathy (H), Abnormal CT scan       Vitamin B-12 1000 MCG/15ML Liqd      Take 8 oz by mouth 2 times daily        Walker Auto Glides Misc      4 Wheeled Walker with Seat and Brakes for home use. For indefinite use    Breast cancer, left (H)

## 2017-11-07 ENCOUNTER — HOSPITAL ENCOUNTER (OUTPATIENT)
Dept: LAB | Facility: CLINIC | Age: 47
Discharge: HOME | End: 2017-11-07
Attending: INTERNAL MEDICINE | Admitting: INTERNAL MEDICINE
Payer: MEDICARE

## 2017-11-07 DIAGNOSIS — Z85.3 PERSONAL HISTORY OF MALIGNANT NEOPLASM OF BREAST: ICD-10-CM

## 2017-11-07 LAB
ALBUMIN SERPL-MCNC: 3.9 G/DL (ref 3.4–5)
ALP SERPL-CCNC: 89 U/L (ref 40–150)
ALT SERPL W P-5'-P-CCNC: 19 U/L (ref 0–50)
ANION GAP SERPL CALCULATED.3IONS-SCNC: 10 MMOL/L (ref 3–14)
AST SERPL W P-5'-P-CCNC: 21 U/L (ref 0–45)
BASOPHILS # BLD AUTO: 0 10E9/L (ref 0–0.2)
BASOPHILS NFR BLD AUTO: 0.6 %
BILIRUB SERPL-MCNC: 0.5 MG/DL (ref 0.2–1.3)
BUN SERPL-MCNC: 14 MG/DL (ref 7–30)
CALCIUM SERPL-MCNC: 8.7 MG/DL (ref 8.5–10.1)
CHLORIDE SERPL-SCNC: 107 MMOL/L (ref 94–109)
CO2 SERPL-SCNC: 23 MMOL/L (ref 20–32)
CREAT SERPL-MCNC: 0.55 MG/DL (ref 0.52–1.04)
DIFFERENTIAL METHOD BLD: ABNORMAL
EOSINOPHIL # BLD AUTO: 0 10E9/L (ref 0–0.7)
EOSINOPHIL NFR BLD AUTO: 1.1 %
ERYTHROCYTE [DISTWIDTH] IN BLOOD BY AUTOMATED COUNT: 12.7 % (ref 10–15)
GFR SERPL CREATININE-BSD FRML MDRD: >90 ML/MIN/1.7M2
GLUCOSE SERPL-MCNC: 79 MG/DL (ref 70–99)
HCT VFR BLD AUTO: 40.2 % (ref 35–47)
HGB BLD-MCNC: 13.8 G/DL (ref 11.7–15.7)
IMM GRANULOCYTES # BLD: 0 10E9/L (ref 0–0.4)
IMM GRANULOCYTES NFR BLD: 0 %
LYMPHOCYTES # BLD AUTO: 0.7 10E9/L (ref 0.8–5.3)
LYMPHOCYTES NFR BLD AUTO: 20.2 %
MCH RBC QN AUTO: 31.4 PG (ref 26.5–33)
MCHC RBC AUTO-ENTMCNC: 34.3 G/DL (ref 31.5–36.5)
MCV RBC AUTO: 92 FL (ref 78–100)
MONOCYTES # BLD AUTO: 0.3 10E9/L (ref 0–1.3)
MONOCYTES NFR BLD AUTO: 9.6 %
NEUTROPHILS # BLD AUTO: 2.4 10E9/L (ref 1.6–8.3)
NEUTROPHILS NFR BLD AUTO: 68.5 %
PLATELET # BLD AUTO: 154 10E9/L (ref 150–450)
POTASSIUM SERPL-SCNC: 3.3 MMOL/L (ref 3.4–5.3)
PROT SERPL-MCNC: 7.5 G/DL (ref 6.8–8.8)
RBC # BLD AUTO: 4.39 10E12/L (ref 3.8–5.2)
SODIUM SERPL-SCNC: 140 MMOL/L (ref 133–144)
WBC # BLD AUTO: 3.6 10E9/L (ref 4–11)

## 2017-11-07 PROCEDURE — 36415 COLL VENOUS BLD VENIPUNCTURE: CPT | Performed by: INTERNAL MEDICINE

## 2017-11-07 PROCEDURE — 85025 COMPLETE CBC W/AUTO DIFF WBC: CPT | Performed by: INTERNAL MEDICINE

## 2017-11-07 PROCEDURE — 80053 COMPREHEN METABOLIC PANEL: CPT | Performed by: INTERNAL MEDICINE

## 2017-11-07 PROCEDURE — 86300 IMMUNOASSAY TUMOR CA 15-3: CPT | Performed by: INTERNAL MEDICINE

## 2017-11-08 LAB — CANCER AG27-29 SERPL-ACNC: 19 U/ML (ref 0–39)

## 2017-11-14 ENCOUNTER — ONCOLOGY VISIT (OUTPATIENT)
Dept: ONCOLOGY | Facility: CLINIC | Age: 47
End: 2017-11-14
Attending: INTERNAL MEDICINE
Payer: MEDICARE

## 2017-11-14 VITALS
DIASTOLIC BLOOD PRESSURE: 38 MMHG | TEMPERATURE: 98.8 F | RESPIRATION RATE: 16 BRPM | WEIGHT: 120.4 LBS | HEART RATE: 70 BPM | HEIGHT: 61 IN | BODY MASS INDEX: 22.73 KG/M2 | OXYGEN SATURATION: 94 % | SYSTOLIC BLOOD PRESSURE: 99 MMHG

## 2017-11-14 DIAGNOSIS — G62.9 NEUROPATHY: ICD-10-CM

## 2017-11-14 DIAGNOSIS — Z85.3 PERSONAL HISTORY OF MALIGNANT NEOPLASM OF BREAST: Primary | ICD-10-CM

## 2017-11-14 DIAGNOSIS — E87.6 HYPOKALEMIA: ICD-10-CM

## 2017-11-14 DIAGNOSIS — D72.810 LYMPHOPENIA: ICD-10-CM

## 2017-11-14 PROCEDURE — 99214 OFFICE O/P EST MOD 30 MIN: CPT | Performed by: INTERNAL MEDICINE

## 2017-11-14 PROCEDURE — 99211 OFF/OP EST MAY X REQ PHY/QHP: CPT

## 2017-11-14 ASSESSMENT — PAIN SCALES - GENERAL: PAINLEVEL: SEVERE PAIN (6)

## 2017-11-14 NOTE — NURSING NOTE
"Oncology Rooming Note    November 14, 2017 10:17 AM   Paola Alicea is a 47 year old female who presents for:    Chief Complaint   Patient presents with     Oncology Clinic Visit     4 month recheck Breast CA, review labs      Initial Vitals: BP (!) 99/38 (BP Location: Left arm, Patient Position: Sitting, Cuff Size: Adult Regular)  Pulse 70  Temp 98.8  F (37.1  C) (Tympanic)  Resp 16  Ht 1.537 m (5' 0.51\")  Wt 54.6 kg (120 lb 6.4 oz)  LMP 08/07/2012  SpO2 94%  Breastfeeding? No  BMI 23.12 kg/m2 Estimated body mass index is 23.12 kg/(m^2) as calculated from the following:    Height as of this encounter: 1.537 m (5' 0.51\").    Weight as of this encounter: 54.6 kg (120 lb 6.4 oz). Body surface area is 1.53 meters squared.  Severe Pain (6) Comment: Right   Patient's last menstrual period was 08/07/2012.  Allergies reviewed: Yes  Medications reviewed: Yes    Medications: Medication refills not needed today.  Pharmacy name entered into [employer]: [id number] [address]    Clinical concerns: 4 month recheck Breast CA, review labs.     7 minutes for nursing intake (face to face time)     Brunilda Carter CMA              "

## 2017-11-14 NOTE — MR AVS SNAPSHOT
After Visit Summary   11/14/2017    Paola Alicea    MRN: [id number]           Patient Information     Date Of Birth          1970        Visit Information        Provider Department      11/14/2017 10:15 AM Kelly Thomas MD [facility]        Today's Diagnoses     Personal history of malignant neoplasm of breast    -  1    Lymphopenia        Hypokalemia        Neuropathy          Care Instructions    6 months f/u with labs          Follow-ups after your visit        Your next 10 appointments already scheduled     May 08, 2018 10:30 AM CDT   LAB with [facility] ([facility])    [address]  [place] [facility] [place] [address]   [phone]           Please do not eat 10-12 hours before your appointment if you are coming in fasting for labs on lipids, cholesterol, or glucose (sugar). This does not apply to pregnant women. Water, hot tea and black coffee (with nothing added) are okay. Do not drink other fluids, diet soda or chew gum.            May 15, 2018  1:45 PM CDT   Return Visit with Kelly Thomas MD   [facility] ([facility])    [facility]  [address]  [place] [facility] [address]   [phone]              Who to contact     If you have questions or need follow up information about today's clinic visit or your schedule please contact [facility] [place] directly at [phone].  Normal or non-critical lab and imaging results will be communicated to you by MyChart, letter or phone within 4 business days after the clinic has received the results. If you do not hear from us within 7 days, please contact the clinic through MyChart or phone. If you have a critical or abnormal lab result, we will notify you by phone as soon as possible.  Submit refill requests through [employer] or call your pharmacy and they will forward the refill request to us. Please allow 3 business days for your refill to be  "completed.          Additional Information About Your Visit        [employer] Information     [email or web address] lets you send messages to your doctor, view your test results, renew your prescriptions, schedule appointments and more. To sign up, go to [email or web address] . Click on \"Log in\" on the left side of the screen, which will take you to the Welcome page. Then click on \"Sign up Now\" on the right side of the page.     You will be asked to enter the access code listed below, as well as some personal information. Please follow the directions to create your username and password.     Your access code is: [email or web address]  Expires: 2018 10:38 AM     Your access code will  in 90 days. If you need help or a new code, please call your [facility] clinic or [phone].        Care EveryWhere ID     This is your Care EveryWhere ID. This could be used by other organizations to access your [facility] medical records  [id number]        Your Vitals Were     Pulse Temperature Respirations Height Last Period Pulse Oximetry    70 98.8  F (37.1  C) (Tympanic) 16 1.537 m (5' 0.51\") 2012 94%    Breastfeeding? BMI (Body Mass Index)                No 23.12 kg/m2           Blood Pressure from Last 3 Encounters:   17 (!) 99/38   07/10/17 103/69   17 108/73    Weight from Last 3 Encounters:   17 54.6 kg (120 lb 6.4 oz)   07/10/17 57.8 kg (127 lb 6.4 oz)   17 56.7 kg (125 lb)              Today, you had the following     No orders found for display       Primary Care Provider Office Phone # Fax #    Ibeth Beltran Steiner [phone]       [facility] [address] [address]        Equal Access to Services     LEONIDES CHAIREZ : raine Chen, darya taveras. [facility] [phone].    ATENCIÓN: Si habla español, tiene a aviles disposición servicios gratuitos de asistencia lingüística. Llame al " [phone].    We comply with applicable federal civil rights laws and [place] laws. We do not discriminate on the basis of race, color, national origin, age, disability, sex, sexual orientation, or gender identity.            Thank you!     Thank you for choosing [facility]  for your care. Our goal is always to provide you with excellent care. Hearing back from our patients is one way we can continue to improve our services. Please take a few minutes to complete the written survey that you may receive in the mail after your visit with us. Thank you!             Your Updated Medication List - Protect others around you: Learn how to safely use, store and throw away your medicines at www.disposemymeds.org.          This list is accurate as of: 11/14/17 10:38 AM.  Always use your most recent med list.                   Brand Name Dispense Instructions for use Diagnosis    amitriptyline 50 MG tablet    ELAVIL     Take 50 mg by mouth        ascorbic acid 1000 MG Tabs    vitamin C     Take 8 oz by mouth        celecoxib 200 MG capsule    celeBREX     TAKE 1 CAPSULE BY MOUTH TWICE DAILY WITH MEALS    Personal history of malignant neoplasm of breast, Insomnia, unspecified type, Neuropathy, Abnormal CT scan       FLECTOR 1.3 % Patch   Generic drug:  diclofenac       Breast cancer, left (H)       ibuprofen 600 MG tablet    ADVIL/MOTRIN          metoprolol 12.5 mg Tabs half-tab    TOPROL-XL     Take 12.5 mg by mouth daily        mirtazapine 15 MG tablet    REMERON          NEURONTIN 300 MG capsule   Generic drug:  gabapentin      Take 900 mg by mouth 2 times daily        omeprazole 20 MG CR capsule    priLOSEC     Take 20 mg by mouth daily.        order for DME      Hospital bed with rails for home use. Length of need: 99    Breast cancer, left (H)       sertraline 100 MG tablet    ZOLOFT          SF 1.1 % Gel topical gel   Generic drug:  sodium fluoride dental gel      USE TO BRUSH TEETH DAILY AT BEDTIME,  EXPECTORATE-DO NOT RINSE.    Personal history of malignant neoplasm of breast, Insomnia, unspecified type, Neuropathy, Abnormal CT scan       sucralfate 1 GM tablet    CARAFATE     TAKE 1 TABLET BY MOUTH FOUR TIMES DAILY BEFORE MEALS AND AT BEDTIME    Personal history of malignant neoplasm of breast, Insomnia, unspecified type, Neuropathy, Abnormal CT scan       venlafaxine 150 MG 24 hr capsule    EFFEXOR-XR     TAKE 1 CAPSULE BY MOUTH ONCE DAILY WITH A MEAL    Personal history of malignant neoplasm of breast, Insomnia, unspecified type, Neuropathy, Abnormal CT scan       Vitamin B-12 1000 MCG/15ML Liqd      Take 8 oz by mouth 2 times daily        Walker Auto Glides Misc      4 Wheeled Walker with Seat and Brakes for home use. For indefinite use    Breast cancer, left (H)

## 2017-11-14 NOTE — PROGRESS NOTES
"ONCOLOGY FOLLOW UP VISIT     BREAST SURGEON:  Dr. Bell Ulrich, Breast Surgeon at [facility].      REASON FOR CONSULTATION AND REASON FOR VISIT:  left breast cancer 2015, ER/ME-, Her 2 low +, s/p neoadjuvant chemo      HISTORY OF PRESENT ILLNESS: She presented with a palpable mass in the left breast for 3 weeks at age 45.  Diagnostic mammogram in 06/2015 identified a 3 cm mass in the upper outer quadrant of the left breast.  This is around at 1 o'clock position, 9 cm from the nipple.  Biopsy indicating ER/ME negative, HER-2 low positive, FISH ratio 2.2, copy number was 3.7.      Staging PET found hypermetabolic adenopathy in the mediastinum, bilateral hilar regions, left lateral posterior nasopharynx and oropharynx, small left axilla LN. EBUS by [facility] 8/2015 found non-necrotizing granulomas and negative for malignancy.     She was seen by Dr. Rodriguez and recommended neoadjuvant AC with wkly taxol , C1 AC 8/17/2015. Dr. Rodriguez requested further Her 2 testing at [facility], who read is as \"+\".      Wkly T taxol 80 mg/m2, Pertuzumab and herceptin q 3 wks are offered after DD AC. She had severe diarrhea and hypkalemia, Pertuzumab was dropped after 2 cycles. She finished total 12 wks of wkly taxol and herceptin.     She had Left lumpectomy 3/1/2016 at [facility] to have 0.7 cm residual high grade IDC, TN, clear margin, 3 LNs are negative. Adjuvant herceptin is offered for 1 yr till 3/2016. S/p RT till 5/2016.       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  She subsequently had knee problems on the left side, eventually required amputation around 2011 through different steps of surgery.  Reflux disease, chronic pain syndrome, moderate major depression.      MEDICATIONS:  Reviewed in Epic system.      SOCIAL HISTORY:  She lives in [place].  She moved from [place] to [place] than from [place] to [place] close to her family.  She has her personal care agent.  She is " "not .  She has never been pregnant and she has no kids.  She lives by herself in an apartment.   She is very involved with community work.      FAMILY HISTORY:  No family history of breast or ovarian cancer or other cancer on the mother's side.  Father's side history is unreliable.      REVIEW OF SYSTEMS:   Feels fine, persistent neuropathy on toes and left leg post amputation.   RHEUMATOLOGY/MUSCULOSKELETAL SYSTEM:  Above knee amputation of the left leg.  Chronic pain syndrome.      PHYSICAL EXAMINATION:   GENERAL APPEARANCE:  A middle-aged woman, looks like her stated age.  She is very on top of her medical history.   VITAL SIGNS:  Blood pressure (!) 99/38, pulse 70, temperature 98.8  F (37.1  C), temperature source Tympanic, resp. rate 16, height 1.537 m (5' 0.51\"), weight 54.6 kg (120 lb 6.4 oz), last menstrual period 08/07/2012, SpO2 94 %, not currently breastfeeding.  HEENT: The patient is normocephalic, atraumatic. Pupils are equally reactive to light.  Sclerae are anicteric.  Moist oral mucosa.  Negative pharynx.  No oral thrush.   NECK:  Supple.  No jugular venous distention.  Thyroid is not palpable.   LYMPH NODES:  Superficial lymphadenopathy is not appreciable in the bilateral cervical, supraclavicular, axillary or inguinal areas.   CARDIOVASCULAR:  S1, S2 regular with no murmurs or gallops.  No carotid or abdominal bruits.   PULMONARY:  Lungs are clear to auscultation and percussion bilaterally.  There is no wheezing or rhonchi.   GASTROINTESTINAL:  Abdomen is soft, nontender.  No hepatosplenomegaly.  No signs of ascites.  No mass appreciable.   MUSCULOSKELETAL/EXTREMITIES:  Left above the knee amputation for osteosarcoma  NEUROLOGIC:  Cranial nerves II-XII are grossly intact.  Sensation intact.  Muscle strength and muscle tone symmetrical, 5/5 throughout.   BACK:  No spinal or paraspinal tenderness.  No CVA tenderness.   SKIN:  No petechiae.  No rash.  No signs of cellulitis. Diffuse dry " skin.  BREASTS: well healed lumpectomy scar wihtout edema or ecchymoses.        CURRENT LABORATORY DATA REVIEWED  7/2017 WBC 3.8, diff is fine, CMP is fine, marker is good.     3/2017 cbc diff/LFT/marker are fine/    Current Image Reviewed  MA at [facility] 8/2017 - negative    CT body 3/2017: There is worsening or mediastinal and hilar adenopathy.  There also is a new mildly prominent gastrohepatic ligament lymph node. She was referred to pulmonary at , tumor conference discussion no biopsy is recommended.     MA 9/2016 from [place]: negative.     OLD DATA REVIEW WITH SUMMARY  8/2015 EBUS at [facility] biopsy of station 4R, 11L, 7 - none malignancy, non-necrotizing granulomas.    BCRA 1/2 mutation from [facility] 10/2015: negative.     7/2015 [id number] baseline 52  Cbc diff/CMP are good.       PET 7/2015  1. Focal asymmetric hypermetabolism at the left lateral posterior nasopharynx and oropharynx, series 102 image 36 (SUV Max 9.4). There is no conspicuous mass lesion seen at this location.  2. Hypermetabolic adenopathy in the mediastinum, bilateral hilar regions, and suspected at the left axilla.   1)Small left axillary lymph node shows mild hypermetabolism and measures 1.6 x 1.0 cm series 3 image 104 (SUV Max 2.8). A few adjacent left axillary small lymph nodes has minimal elevated metabolism.  2) Left hilar adenopathy measures 1.3 x 0.8 cm, series 3 image 109 (SUV Max 9.3). Example of a right hilar adenopathy measures 1.7 x 1.1 cm, series 3 image 112 (SUV Max 11.3).   3) Example of mediastinal adenopathy at the subcarinal location measures 2.0 x 0.9 cm, series 3 image 115 (SUV Max 8.4). There are numerous additional examples.  3. Left lateral breast mass is 3.2 x 3.0 cm, series 3 image 103 (SUV Max 16.4). Trace pericardial fluid.  4. Hazy ground-glass opacities and some interstitial prominence could represent pulmonary edema or other alveolar infiltrate. There is a small pericardial effusion.         ASSESSMENT AND PLAN:    1. She was diagnosed clinically T2 N1 MX  ER/CT -, Her 2 low + left breast upper outer quadrant of the left breast high-grade invasive ductal cancer 6/2015 s/p neoadjuvant S/p DD AC -- wkly taxol and herceptin partial pertuzumab (dropped due to severe diarrhea).   She had Left lumpectomy 3/1/2016 at [facility] to have 0.7 cm residual high grade IDC, TN, clear margin, 3 LNs are negative. Adjuvant herceptin is offered for 1 yr till 3/2016. S/p RT till 5/2016.     She needs follow up 6 4 months for now.     Early diagnosis breast cancer younger than 50 years old.  She saw genetic at [facility] 8/25/2015, BRCA 1/2 tests was negative.    2. On going neuropathy on toes. She is on gabapentin. She reports this is better.     3. ROSSY mediastinum, bilateral hilar regions, left lateral posterior nasopharynx and oropharynx, small left axilla LN. EBUS by [facility] 8/2015 found non-necrotizing granulomas.   CT 3/2017 found worsening ROSSY in chest.   Adviced pulmonary evaluation. She is not compliant on this.     4.  Insomnia. She is on amitriptyline and gabapentin.      5. Mild leukopenia/lymphopenia - advice high dose vit C in flu season.    6. Mild hypokalemia - advice K rich diet. She has no diarrhea, not on diuretic.

## 2017-11-14 NOTE — LETTER
"    11/14/2017         RE: Paola Alicea  [address]   [place] [facility] [address]        Dear Colleague,    Thank you for referring your patient, Paola Alicea, to the [facility]. Please see a copy of my visit note below.    ONCOLOGY FOLLOW UP VISIT     BREAST SURGEON:  Dr. Bell Urlich, Breast Surgeon at [facility].      REASON FOR CONSULTATION AND REASON FOR VISIT:  left breast cancer 2015, ER/CA-, Her 2 low +, s/p neoadjuvant chemo      HISTORY OF PRESENT ILLNESS: She presented with a palpable mass in the left breast for 3 weeks at age 45.  Diagnostic mammogram in 06/2015 identified a 3 cm mass in the upper outer quadrant of the left breast.  This is around at 1 o'clock position, 9 cm from the nipple.  Biopsy indicating ER/CA negative, HER-2 low positive, FISH ratio 2.2, copy number was 3.7.      Staging PET found hypermetabolic adenopathy in the mediastinum, bilateral hilar regions, left lateral posterior nasopharynx and oropharynx, small left axilla LN. EBUS by [facility] 8/2015 found non-necrotizing granulomas and negative for malignancy.     She was seen by Dr. Rodriguez and recommended neoadjuvant AC with wkly taxol , C1 AC 8/17/2015. Dr. Rodriguez requested further Her 2 testing at [facility], who read is as \"+\".      Wkly T taxol 80 mg/m2, Pertuzumab and herceptin q 3 wks are offered after DD AC. She had severe diarrhea and hypkalemia, Pertuzumab was dropped after 2 cycles. She finished total 12 wks of wkly taxol and herceptin.     She had Left lumpectomy 3/1/2016 at [facility] to have 0.7 cm residual high grade IDC, TN, clear margin, 3 LNs are negative. Adjuvant herceptin is offered for 1 yr till 3/2016. S/p RT till 5/2016.       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  She subsequently had knee problems on the left side, eventually required amputation around 2011 through different steps of surgery.  Reflux disease, chronic pain syndrome, moderate major " "depression.      MEDICATIONS:  Reviewed in Epic system.      SOCIAL HISTORY:  She lives in [place].  She moved from [place] to [place] than from [place] to [place] close to her family.  She has her personal care agent.  She is not .  She has never been pregnant and she has no kids.  She lives by herself in an apartment.   She is very involved with community work.      FAMILY HISTORY:  No family history of breast or ovarian cancer or other cancer on the mother's side.  Father's side history is unreliable.      REVIEW OF SYSTEMS:   Feels fine, persistent neuropathy on toes and left leg post amputation.   RHEUMATOLOGY/MUSCULOSKELETAL SYSTEM:  Above knee amputation of the left leg.  Chronic pain syndrome.      PHYSICAL EXAMINATION:   GENERAL APPEARANCE:  A middle-aged woman, looks like her stated age.  She is very on top of her medical history.   VITAL SIGNS:  Blood pressure (!) 99/38, pulse 70, temperature 98.8  F (37.1  C), temperature source Tympanic, resp. rate 16, height 1.537 m (5' 0.51\"), weight 54.6 kg (120 lb 6.4 oz), last menstrual period 08/07/2012, SpO2 94 %, not currently breastfeeding.  HEENT: The patient is normocephalic, atraumatic. Pupils are equally reactive to light.  Sclerae are anicteric.  Moist oral mucosa.  Negative pharynx.  No oral thrush.   NECK:  Supple.  No jugular venous distention.  Thyroid is not palpable.   LYMPH NODES:  Superficial lymphadenopathy is not appreciable in the bilateral cervical, supraclavicular, axillary or inguinal areas.   CARDIOVASCULAR:  S1, S2 regular with no murmurs or gallops.  No carotid or abdominal bruits.   PULMONARY:  Lungs are clear to auscultation and percussion bilaterally.  There is no wheezing or rhonchi.   GASTROINTESTINAL:  Abdomen is soft, nontender.  No hepatosplenomegaly.  No signs of ascites.  No mass appreciable.   MUSCULOSKELETAL/EXTREMITIES:  Left above the knee amputation for osteosarcoma  NEUROLOGIC:  Cranial nerves " II-XII are grossly intact.  Sensation intact.  Muscle strength and muscle tone symmetrical, 5/5 throughout.   BACK:  No spinal or paraspinal tenderness.  No CVA tenderness.   SKIN:  No petechiae.  No rash.  No signs of cellulitis. Diffuse dry skin.  BREASTS: well healed lumpectomy scar wihtout edema or ecchymoses.        CURRENT LABORATORY DATA REVIEWED  7/2017 WBC 3.8, diff is fine, CMP is fine, marker is good.     3/2017 cbc diff/LFT/marker are fine/    Current Image Reviewed  MA at [facility] 8/2017 - negative    CT body 3/2017: There is worsening or mediastinal and hilar adenopathy.  There also is a new mildly prominent gastrohepatic ligament lymph node. She was referred to pulmonary at , tumor conference discussion no biopsy is recommended.     MA 9/2016 from [place]: negative.     OLD DATA REVIEW WITH SUMMARY  8/2015 EBUS at [facility] biopsy of station 4R, 11L, 7 - none malignancy, non-necrotizing granulomas.    [facility]: negative.     7/2015 [id number] baseline 52  Cbc diff/CMP are good.       PET 7/2015  1. Focal asymmetric hypermetabolism at the left lateral posterior nasopharynx and oropharynx, series 102 image 36 (SUV Max 9.4). There is no conspicuous mass lesion seen at this location.  2. Hypermetabolic adenopathy in the mediastinum, bilateral hilar regions, and suspected at the left axilla.   1)Small left axillary lymph node shows mild hypermetabolism and measures 1.6 x 1.0 cm series 3 image 104 (SUV Max 2.8). A few adjacent left axillary small lymph nodes has minimal elevated metabolism.  2) Left hilar adenopathy measures 1.3 x 0.8 cm, series 3 image 109 (SUV Max 9.3). Example of a right hilar adenopathy measures 1.7 x 1.1 cm, series 3 image 112 (SUV Max 11.3).   3) Example of mediastinal adenopathy at the subcarinal location measures 2.0 x 0.9 cm, series 3 image 115 (SUV Max 8.4). There are numerous additional examples.  3. Left lateral breast mass is 3.2 x 3.0 cm, series 3 image  103 (SUV Max 16.4). Trace pericardial fluid.  4. Hazy ground-glass opacities and some interstitial prominence could represent pulmonary edema or other alveolar infiltrate. There is a small pericardial effusion.         ASSESSMENT AND PLAN:   1. She was diagnosed clinically T2 N1 MX  ER/NM -, Her 2 low + left breast upper outer quadrant of the left breast high-grade invasive ductal cancer 6/2015 s/p neoadjuvant S/p DD AC -- wkly taxol and herceptin partial pertuzumab (dropped due to severe diarrhea).   She had Left lumpectomy 3/1/2016 at [facility] to have 0.7 cm residual high grade IDC, TN, clear margin, 3 LNs are negative. Adjuvant herceptin is offered for 1 yr till 3/2016. S/p RT till 5/2016.     She needs follow up 6 4 months for now.     Early diagnosis breast cancer younger than 50 years old.  She saw genetic at [facility] 8/25/2015, BRCA 1/2 tests was negative.    2. On going neuropathy on toes. She is on gabapentin. She reports this is better.     3. ROSSY mediastinum, bilateral hilar regions, left lateral posterior nasopharynx and oropharynx, small left axilla LN. EBUS by [facility] 8/2015 found non-necrotizing granulomas.   CT 3/2017 found worsening ROSSY in chest.   Adviced pulmonary evaluation. She is not compliant on this.     4.  Insomnia. She is on amitriptyline and gabapentin.      5. Mild leukopenia/lymphopenia - advice high dose vit C in flu season.    6. Mild hypokalemia - advice K rich diet. She has no diarrhea, not on diuretic.     Again, thank you for allowing me to participate in the care of your patient.        Sincerely,        Kelly Thomas MD, MD

## 2017-11-14 NOTE — PATIENT INSTRUCTIONS
We would like to see you back in 6 months for a follow up appointment with labs prior. When you are in need of a refill, please call your pharmacy and they will send us a request.  Copy of appointments, and after visit summary (AVS) given to patient.  If you have any questions please call Rbui Lacy RN, BSN Oncology Hematology  [facility] [phone]. For questions after business hours, or on holidays/weekends, please call our after hours Nurse Triage line [phone]. Thank you.           6 months f/u with labs

## 2018-02-14 ENCOUNTER — HOSPITAL ENCOUNTER (OUTPATIENT)
Dept: CT IMAGING | Facility: CLINIC | Age: 48
Discharge: HOME | End: 2018-02-14
Attending: FAMILY MEDICINE | Admitting: FAMILY MEDICINE
Payer: MEDICARE

## 2018-02-14 DIAGNOSIS — R59.1 LYMPHADENOPATHY: ICD-10-CM

## 2018-02-14 PROCEDURE — 71250 CT THORAX DX C-: CPT

## 2018-05-08 ENCOUNTER — HOSPITAL ENCOUNTER (OUTPATIENT)
Dept: LAB | Facility: CLINIC | Age: 48
Discharge: HOME | End: 2018-05-08
Attending: INTERNAL MEDICINE | Admitting: INTERNAL MEDICINE
Payer: MEDICARE

## 2018-05-08 DIAGNOSIS — G62.9 NEUROPATHY: ICD-10-CM

## 2018-05-08 DIAGNOSIS — D72.810 LYMPHOPENIA: ICD-10-CM

## 2018-05-08 DIAGNOSIS — Z85.3 PERSONAL HISTORY OF MALIGNANT NEOPLASM OF BREAST: ICD-10-CM

## 2018-05-08 DIAGNOSIS — E87.6 HYPOKALEMIA: ICD-10-CM

## 2018-05-08 LAB
ALBUMIN SERPL-MCNC: 3.6 G/DL (ref 3.4–5)
ALP SERPL-CCNC: 85 U/L (ref 40–150)
ALT SERPL W P-5'-P-CCNC: 21 U/L (ref 0–50)
ANION GAP SERPL CALCULATED.3IONS-SCNC: 4 MMOL/L (ref 3–14)
AST SERPL W P-5'-P-CCNC: 20 U/L (ref 0–45)
BASOPHILS # BLD AUTO: 0 10E9/L (ref 0–0.2)
BASOPHILS NFR BLD AUTO: 0.3 %
BILIRUB SERPL-MCNC: 0.3 MG/DL (ref 0.2–1.3)
BUN SERPL-MCNC: 7 MG/DL (ref 7–30)
CALCIUM SERPL-MCNC: 8.8 MG/DL (ref 8.5–10.1)
CANCER AG27-29 SERPL-ACNC: 21 U/ML (ref 0–39)
CHLORIDE SERPL-SCNC: 109 MMOL/L (ref 94–109)
CO2 SERPL-SCNC: 28 MMOL/L (ref 20–32)
CREAT SERPL-MCNC: 0.43 MG/DL (ref 0.52–1.04)
DIFFERENTIAL METHOD BLD: ABNORMAL
EOSINOPHIL # BLD AUTO: 0 10E9/L (ref 0–0.7)
EOSINOPHIL NFR BLD AUTO: 0.5 %
ERYTHROCYTE [DISTWIDTH] IN BLOOD BY AUTOMATED COUNT: 13.2 % (ref 10–15)
GFR SERPL CREATININE-BSD FRML MDRD: >90 ML/MIN/1.7M2
GLUCOSE SERPL-MCNC: 85 MG/DL (ref 70–99)
HCT VFR BLD AUTO: 41.1 % (ref 35–47)
HGB BLD-MCNC: 13.7 G/DL (ref 11.7–15.7)
IMM GRANULOCYTES # BLD: 0 10E9/L (ref 0–0.4)
IMM GRANULOCYTES NFR BLD: 1 %
LYMPHOCYTES # BLD AUTO: 0.6 10E9/L (ref 0.8–5.3)
LYMPHOCYTES NFR BLD AUTO: 15.2 %
MCH RBC QN AUTO: 30.5 PG (ref 26.5–33)
MCHC RBC AUTO-ENTMCNC: 33.3 G/DL (ref 31.5–36.5)
MCV RBC AUTO: 92 FL (ref 78–100)
MONOCYTES # BLD AUTO: 0.3 10E9/L (ref 0–1.3)
MONOCYTES NFR BLD AUTO: 8.5 %
NEUTROPHILS # BLD AUTO: 2.9 10E9/L (ref 1.6–8.3)
NEUTROPHILS NFR BLD AUTO: 74.5 %
PLATELET # BLD AUTO: 186 10E9/L (ref 150–450)
POTASSIUM SERPL-SCNC: 3.7 MMOL/L (ref 3.4–5.3)
PROT SERPL-MCNC: 7.6 G/DL (ref 6.8–8.8)
RBC # BLD AUTO: 4.49 10E12/L (ref 3.8–5.2)
SODIUM SERPL-SCNC: 141 MMOL/L (ref 133–144)
WBC # BLD AUTO: 3.9 10E9/L (ref 4–11)

## 2018-05-08 PROCEDURE — 36415 COLL VENOUS BLD VENIPUNCTURE: CPT | Performed by: INTERNAL MEDICINE

## 2018-05-08 PROCEDURE — 86300 IMMUNOASSAY TUMOR CA 15-3: CPT | Performed by: INTERNAL MEDICINE

## 2018-05-08 PROCEDURE — 80053 COMPREHEN METABOLIC PANEL: CPT | Performed by: INTERNAL MEDICINE

## 2018-05-08 PROCEDURE — 85025 COMPLETE CBC W/AUTO DIFF WBC: CPT | Performed by: INTERNAL MEDICINE

## 2018-05-15 ENCOUNTER — ONCOLOGY VISIT (OUTPATIENT)
Dept: ONCOLOGY | Facility: CLINIC | Age: 48
End: 2018-05-15
Attending: INTERNAL MEDICINE
Payer: MEDICARE

## 2018-05-15 VITALS
RESPIRATION RATE: 16 BRPM | DIASTOLIC BLOOD PRESSURE: 73 MMHG | TEMPERATURE: 98.6 F | WEIGHT: 118.1 LBS | HEART RATE: 84 BPM | BODY MASS INDEX: 22.3 KG/M2 | SYSTOLIC BLOOD PRESSURE: 115 MMHG | HEIGHT: 61 IN | OXYGEN SATURATION: 98 %

## 2018-05-15 DIAGNOSIS — G62.9 NEUROPATHY: ICD-10-CM

## 2018-05-15 DIAGNOSIS — D72.810 LYMPHOPENIA: ICD-10-CM

## 2018-05-15 DIAGNOSIS — R59.9 ENLARGED LYMPH NODES: ICD-10-CM

## 2018-05-15 DIAGNOSIS — Z85.3 PERSONAL HISTORY OF MALIGNANT NEOPLASM OF BREAST: Primary | ICD-10-CM

## 2018-05-15 PROCEDURE — G0463 HOSPITAL OUTPT CLINIC VISIT: HCPCS

## 2018-05-15 PROCEDURE — 99214 OFFICE O/P EST MOD 30 MIN: CPT | Performed by: INTERNAL MEDICINE

## 2018-05-15 ASSESSMENT — PAIN SCALES - GENERAL: PAINLEVEL: SEVERE PAIN (7)

## 2018-05-15 NOTE — MR AVS SNAPSHOT
After Visit Summary   5/15/2018    Paola Alicea    MRN: [id number]           Patient Information     Date Of Birth          1970        Visit Information        Provider Department      5/15/2018 1:45 PM Kelly Thomas MD [facility]        Today's Diagnoses     Personal history of malignant neoplasm of breast    -  1    Lymphopenia        Neuropathy          Care Instructions    MA at [facility] in Aug. 6 months f/u with labs.           Follow-ups after your visit        Your next 10 appointments already scheduled     Nov 06, 2018  1:20 PM CST   LAB with [facility] ([facility])    [address], [place] [address]   [phone]           Please do not eat 10-12 hours before your appointment if you are coming in fasting for labs on lipids, cholesterol, or glucose (sugar). This does not apply to pregnant women. Water, hot tea and black coffee (with nothing added) are okay. Do not drink other fluids, diet soda or chew gum.            Nov 13, 2018  1:30 PM CST   Return Visit with Kelly Thomas MD   [facility] ([facility])    [facility]  [address]  [facility] [address] [phone]              Future tests that were ordered for you today     Open Future Orders        Priority Expected Expires Ordered    Ca27.29  breast tumor marker Routine 11/1/2018 12/28/2018 5/15/2018    CBC with platelets differential Routine 11/1/2018 12/28/2018 5/15/2018    Comprehensive metabolic panel Routine 11/1/2018 12/28/2018 5/15/2018    MA Screen Bilateral w/Mariusz Routine 8/1/2018 5/15/2019 5/15/2018            Who to contact     If you have questions or need follow up information about today's clinic visit or your schedule please contact [facility] directly at [phone].  Normal or non-critical lab and imaging results will be communicated to you by MyChart, letter or phone within 4  "business days after the clinic has received the results. If you do not hear from us within 7 days, please contact the clinic through [employer]ailai[employer] or phone. If you have a critical or abnormal lab result, we will notify you by phone as soon as possible.  Submit refill requests through Lailaihui or call your pharmacy and they will forward the refill request to us. Please allow 3 business days for your refill to be completed.          Additional Information About Your Visit        Lailaihui Information     Lailaihui lets you send messages to your doctor, view your test results, renew your prescriptions, schedule appointments and more. To sign up, go to [email or web address] . Click on \"Log in\" on the left side of the screen, which will take you to the Welcome page. Then click on \"Sign up Now\" on the right side of the page.     You will be asked to enter the access code listed below, as well as some personal information. Please follow the directions to create your username and password.     Your access code is: [email or web address]  Expires: 2018  1:50 PM     Your access code will  in 90 days. If you need help or a new code, please call your [facility] clinic or [phone].        Care EveryWhere ID     This is your Care EveryWhere ID. This could be used by other organizations to access your [facility] medical records  [id number]        Your Vitals Were     Pulse Temperature Respirations Height Last Period Pulse Oximetry    84 98.6  F (37  C) (Tympanic) 16 1.537 m (5' 0.51\") 2012 98%    Breastfeeding? BMI (Body Mass Index)                No 22.68 kg/m2           Blood Pressure from Last 3 Encounters:   05/15/18 115/73   17 (!) 99/38   07/10/17 103/69    Weight from Last 3 Encounters:   05/15/18 53.6 kg (118 lb 1.6 oz)   17 54.6 kg (120 lb 6.4 oz)   07/10/17 57.8 kg (127 lb 6.4 oz)               Primary Care Provider Office Phone # Fax #    Ibeth Beltran Steiner [phone]       [facility] [address] [facility] [address]        Equal Access to Services     BARMEG CONTRERAS : Hadii leandro He, raine swanson, caryn caceres, darya osuna. So [facility] [phone].    ATENCIÓN: Si habla español, tiene a aviles disposición servicios gratuitos de asistencia lingüística. Llame al [phone].    We comply with applicable federal civil rights laws and [place] laws. We do not discriminate on the basis of race, color, national origin, age, disability, sex, sexual orientation, or gender identity.            Thank you!     Thank you for choosing [facility]  for your care. Our goal is always to provide you with excellent care. Hearing back from our patients is one way we can continue to improve our services. Please take a few minutes to complete the written survey that you may receive in the mail after your visit with us. Thank you!             Your Updated Medication List - Protect others around you: Learn how to safely use, store and throw away your medicines at www.disposemymeds.org.          This list is accurate as of 5/15/18  1:50 PM.  Always use your most recent med list.                   Brand Name Dispense Instructions for use Diagnosis    amitriptyline 50 MG tablet    ELAVIL     Take 50 mg by mouth        ascorbic acid 1000 MG Tabs    vitamin C     Take 8 oz by mouth        FLECTOR 1.3 % Patch   Generic drug:  diclofenac       Breast cancer, left (H)       ibuprofen 600 MG tablet    ADVIL/MOTRIN          metoprolol succinate 12.5 mg Tabs half-tab    TOPROL-XL     Take 12.5 mg by mouth daily        NEURONTIN 300 MG capsule   Generic drug:  gabapentin      Take 900 mg by mouth 2 times daily        omeprazole 20 MG CR capsule    priLOSEC     Take 20 mg by mouth daily.        order for DME      Hospital bed with rails for home use. Length of need: 99    Breast cancer, left (H)       SF 1.1 % Gel topical gel   Generic drug:  sodium  fluoride dental gel      USE TO BRUSH TEETH DAILY AT BEDTIME, EXPECTORATE-DO NOT RINSE.    Personal history of malignant neoplasm of breast, Insomnia, unspecified type, Neuropathy, Abnormal CT scan       sucralfate 1 GM tablet    CARAFATE     TAKE 1 TABLET BY MOUTH FOUR TIMES DAILY BEFORE MEALS AND AT BEDTIME    Personal history of malignant neoplasm of breast, Insomnia, unspecified type, Neuropathy, Abnormal CT scan       venlafaxine 150 MG 24 hr capsule    EFFEXOR-XR     TAKE 1 CAPSULE BY MOUTH ONCE DAILY WITH A MEAL    Personal history of malignant neoplasm of breast, Insomnia, unspecified type, Neuropathy, Abnormal CT scan       Vitamin B-12 1000 MCG/15ML Liqd      Take 8 oz by mouth 2 times daily        Walker Auto Glides Misc      4 Wheeled Walker with Seat and Brakes for home use. For indefinite use    Breast cancer, left (H)

## 2018-05-15 NOTE — LETTER
"    5/15/2018         RE: Paola Alicea  [address]   [place] [address]        Dear Colleague,    Thank you for referring your patient, Paola Alicea, to the [facility]. Please see a copy of my visit note below.    ONCOLOGY FOLLOW UP VISIT     BREAST SURGEON:  Dr. Bell Ulrich, Breast Surgeon at [facility].      REASON FOR CONSULTATION AND REASON FOR VISIT:  left breast cancer 2015, ER/CT-, Her 2 low +, s/p neoadjuvant chemo      HISTORY OF PRESENT ILLNESS: She presented with a palpable mass in the left breast for 3 weeks at age 45.  Diagnostic mammogram in 06/2015 identified a 3 cm mass in the upper outer quadrant of the left breast.  This is around at 1 o'clock position, 9 cm from the nipple.  Biopsy indicating ER/CT negative, HER-2 low positive, FISH ratio 2.2, copy number was 3.7.      Staging PET found hypermetabolic adenopathy in the mediastinum, bilateral hilar regions, left lateral posterior nasopharynx and oropharynx, small left axilla LN. EBUS by [facility] 8/2015 found non-necrotizing granulomas and negative for malignancy.     She was seen by Dr. Rodriguez and recommended neoadjuvant AC with wkly taxol , C1 AC 8/17/2015. Dr. Rodriguez requested further Her 2 testing at [place], who read is as \"+\".      Wkly T taxol 80 mg/m2, Pertuzumab and herceptin q 3 wks are offered after DD AC. She had severe diarrhea and hypkalemia, Pertuzumab was dropped after 2 cycles. She finished total 12 wks of wkly taxol and herceptin.     She had Left lumpectomy 3/1/2016 at [facility] to have 0.7 cm residual high grade IDC, TN, clear margin, 3 LNs are negative. Adjuvant herceptin is offered for 1 yr till 3/2016. S/p RT till 5/2016.       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  She subsequently had knee problems on the left side, eventually required amputation around 2011 through different steps of surgery.  Reflux disease, chronic pain syndrome, moderate major " "depression.      MEDICATIONS:  Reviewed in Epic system.      SOCIAL HISTORY:  She lives in [place].  She moved from [place] to [place] than from [place] to [place] close to her family.  She has her personal care agent.  She is not .  She has never been pregnant and she has no kids.  She lives by herself in an apartment.   She is very involved with community work.      FAMILY HISTORY:  No family history of breast or ovarian cancer or other cancer on the mother's side.  Father's side history is unreliable.      REVIEW OF SYSTEMS:   Persistent neuropathy on toes and left leg post amputation.   RHEUMATOLOGY/MUSCULOSKELETAL SYSTEM:  Above knee amputation of the left leg.  Chronic pain syndrome.   She had right hip replacement 5 months ago, still has severe 7/10 pain in the area.      PHYSICAL EXAMINATION:   GENERAL APPEARANCE:  A middle-aged woman, looks like her stated age.  She is very on top of her medical history.   VITAL SIGNS:  Blood pressure 115/73, pulse 84, temperature 98.6  F (37  C), temperature source Tympanic, resp. rate 16, height 1.537 m (5' 0.51\"), weight 53.6 kg (118 lb 1.6 oz), last menstrual period 08/07/2012, SpO2 98 %, not currently breastfeeding.  HEENT: The patient is normocephalic, atraumatic. Pupils are equally reactive to light.  Sclerae are anicteric.  Moist oral mucosa.  Negative pharynx.  No oral thrush.   NECK:  Supple.  No jugular venous distention.  Thyroid is not palpable.   LYMPH NODES:  Superficial lymphadenopathy is not appreciable in the bilateral cervical, supraclavicular, axillary or inguinal areas.   CARDIOVASCULAR:  S1, S2 regular with no murmurs or gallops.  No carotid or abdominal bruits.   PULMONARY:  Lungs are clear to auscultation and percussion bilaterally.  There is no wheezing or rhonchi.   GASTROINTESTINAL:  Abdomen is soft, nontender.  No hepatosplenomegaly.  No signs of ascites.  No mass appreciable.   MUSCULOSKELETAL/EXTREMITIES:  Left above " the knee amputation for osteosarcoma  NEUROLOGIC:  Cranial nerves II-XII are grossly intact.  Sensation intact.  Muscle strength and muscle tone symmetrical, 5/5 throughout.   BACK:  No spinal or paraspinal tenderness.  No CVA tenderness.   SKIN:  No petechiae.  No rash.  No signs of cellulitis. Diffuse dry skin.  BREASTS: well healed lumpectomy scar wihtout edema or ecchymoses.        CURRENT LABORATORY DATA REVIEWED   WBC 3.8, diff is fine, ALC 0.6, CMP is fine, marker is good.       Current Image Reviewed  CT 2/2018   1. Previously described mediastinal and hilar nodes are either stable  or smaller, reference nodes as above.  2. No new pulmonary abnormality.    OLD DATA REVIEW WITH SUMMARY  MA at [facility] 8/2017 - negative    CT body 3/2017: There is worsening or mediastinal and hilar adenopathy.  There also is a new mildly prominent gastrohepatic ligament lymph node. She was referred to pulmonary at , tumor conference discussion no biopsy is recommended.     MA 9/2016 from [place]: negative.     8/2015 EBUS at [facility] biopsy of station 4R, 11L, 7 - none malignancy, non-necrotizing granulomas.    BCRA 1/2 mutation from [facility] 10/2015: negative.     7/2015 FR4171 baseline 52  Cbc diff/CMP are good.       PET 7/2015  1. Focal asymmetric hypermetabolism at the left lateral posterior nasopharynx and oropharynx, series 102 image 36 (SUV Max 9.4). There is no conspicuous mass lesion seen at this location.  2. Hypermetabolic adenopathy in the mediastinum, bilateral hilar regions, and suspected at the left axilla.   1)Small left axillary lymph node shows mild hypermetabolism and measures 1.6 x 1.0 cm series 3 image 104 (SUV Max 2.8). A few adjacent left axillary small lymph nodes has minimal elevated metabolism.  2) Left hilar adenopathy measures 1.3 x 0.8 cm, series 3 image 109 (SUV Max 9.3). Example of a right hilar adenopathy measures 1.7 x 1.1 cm, series 3 image 112 (SUV Max 11.3).   3) Example of mediastinal  adenopathy at the subcarinal location measures 2.0 x 0.9 cm, series 3 image 115 (SUV Max 8.4). There are numerous additional examples.  3. Left lateral breast mass is 3.2 x 3.0 cm, series 3 image 103 (SUV Max 16.4). Trace pericardial fluid.  4. Hazy ground-glass opacities and some interstitial prominence could represent pulmonary edema or other alveolar infiltrate. There is a small pericardial effusion.         ASSESSMENT AND PLAN:   1. She was diagnosed clinically T2 N1 MX  ER/OH -, Her 2 low + left breast upper outer quadrant of the left breast high-grade invasive ductal cancer 6/2015 s/p neoadjuvant S/p DD AC -- wkly taxol and herceptin partial pertuzumab (dropped due to severe diarrhea).   She had Left lumpectomy 3/1/2016 at [facility] to have 0.7 cm residual high grade IDC, TN, clear margin, 3 LNs are negative. Adjuvant herceptin is offered for 1 yr till 3/2016. S/p RT till 5/2016.     She needs follow up 6  months for now.     Early diagnosis breast cancer younger than 50 years old.  She saw genetic at [facility] 8/25/2015, BRCA 1/2 tests was negative.    2. On going neuropathy on toes. She is on gabapentin. She reports this is better.     3. ROSSY mediastinum, bilateral hilar regions, left lateral posterior nasopharynx and oropharynx, small left axilla LN. EBUS by [facility] 8/2015 found non-necrotizing granulomas.   CT 3/2017 found worsening ROSSY in chest. Then improved in 2/2018.   Adviced pulmonary evaluation. She is not compliant on this.     4. Mild leukopenia/lymphopenia - advice high dose vit C in flu season.    5. Post op right hip pain - she said she is going to see ortho.     Again, thank you for allowing me to participate in the care of your patient.        Sincerely,        Kelly Thomas MD, MD

## 2018-05-15 NOTE — PATIENT INSTRUCTIONS
Dr. Thomas would like you to have a Mammogram in August at [facility].     We would like to see you back in 6 months for a follow up appointment with labs prior.     When you are in need of a refill, please call your pharmacy and they will send us a request.      Copy of appointments, and after visit summary (AVS) given to patient.      If you have any questions please call Rubi Lacy RN, BSN Oncology Hematology  [facility] [phone]. For questions after business hours, or on holidays/weekends, please call our after hours Nurse Triage line [phone]. Thank you.             MA at [facility] in Aug. 6 months f/u with labs. e5

## 2018-05-15 NOTE — NURSING NOTE
"Oncology Rooming Note    May 15, 2018 1:18 PM   Paola Alicea is a 47 year old female who presents for:    Chief Complaint   Patient presents with     Oncology Clinic Visit     6 month recheck Breast CA, review Labs      Initial Vitals: /73 (BP Location: Right arm, Patient Position: Sitting, Cuff Size: Adult Regular)  Pulse 84  Temp 98.6  F (37  C) (Tympanic)  Resp 16  Ht 1.537 m (5' 0.51\")  Wt 53.6 kg (118 lb 1.6 oz)  LMP 08/07/2012  SpO2 98%  Breastfeeding? No  BMI 22.68 kg/m2 Estimated body mass index is 22.68 kg/(m^2) as calculated from the following:    Height as of this encounter: 1.537 m (5' 0.51\").    Weight as of this encounter: 53.6 kg (118 lb 1.6 oz). Body surface area is 1.51 meters squared.  Severe Pain (7) Comment: right    Patient's last menstrual period was 08/07/2012.  Allergies reviewed: Yes  Medications reviewed: Yes    Medications: Medication refills not needed today.  Pharmacy name entered into [employer]: [id number] - [address]    Clinical concerns: 6 month recheck Breast CA, review Labs.     7 minutes for nursing intake (face to face time)     Brunilda Carter CMA              "

## 2018-08-07 ENCOUNTER — TRANSFERRED RECORDS (OUTPATIENT)
Dept: HEALTH INFORMATION MANAGEMENT | Facility: CLINIC | Age: 48
End: 2018-08-07

## 2018-11-06 ENCOUNTER — HOSPITAL ENCOUNTER (OUTPATIENT)
Dept: LAB | Facility: CLINIC | Age: 48
Discharge: HOME | End: 2018-11-06
Attending: INTERNAL MEDICINE | Admitting: INTERNAL MEDICINE
Payer: MEDICARE

## 2018-11-06 DIAGNOSIS — G62.9 NEUROPATHY: ICD-10-CM

## 2018-11-06 DIAGNOSIS — Z85.3 PERSONAL HISTORY OF MALIGNANT NEOPLASM OF BREAST: ICD-10-CM

## 2018-11-06 DIAGNOSIS — D72.810 LYMPHOPENIA: ICD-10-CM

## 2018-11-06 LAB
ALBUMIN SERPL-MCNC: 3.8 G/DL (ref 3.4–5)
ALP SERPL-CCNC: 84 U/L (ref 40–150)
ALT SERPL W P-5'-P-CCNC: 24 U/L (ref 0–50)
ANION GAP SERPL CALCULATED.3IONS-SCNC: 4 MMOL/L (ref 3–14)
AST SERPL W P-5'-P-CCNC: 23 U/L (ref 0–45)
BASOPHILS # BLD AUTO: 0 10E9/L (ref 0–0.2)
BASOPHILS NFR BLD AUTO: 0.4 %
BILIRUB SERPL-MCNC: 0.5 MG/DL (ref 0.2–1.3)
BUN SERPL-MCNC: 10 MG/DL (ref 7–30)
CALCIUM SERPL-MCNC: 8.7 MG/DL (ref 8.5–10.1)
CANCER AG27-29 SERPL-ACNC: 27 U/ML (ref 0–39)
CHLORIDE SERPL-SCNC: 106 MMOL/L (ref 94–109)
CO2 SERPL-SCNC: 32 MMOL/L (ref 20–32)
CREAT SERPL-MCNC: 0.63 MG/DL (ref 0.52–1.04)
DIFFERENTIAL METHOD BLD: NORMAL
EOSINOPHIL # BLD AUTO: 0.1 10E9/L (ref 0–0.7)
EOSINOPHIL NFR BLD AUTO: 1.1 %
ERYTHROCYTE [DISTWIDTH] IN BLOOD BY AUTOMATED COUNT: 12.6 % (ref 10–15)
GFR SERPL CREATININE-BSD FRML MDRD: >90 ML/MIN/1.7M2
GLUCOSE SERPL-MCNC: 95 MG/DL (ref 70–99)
HCT VFR BLD AUTO: 41.2 % (ref 35–47)
HGB BLD-MCNC: 13.5 G/DL (ref 11.7–15.7)
IMM GRANULOCYTES # BLD: 0 10E9/L (ref 0–0.4)
IMM GRANULOCYTES NFR BLD: 0.2 %
LYMPHOCYTES # BLD AUTO: 0.8 10E9/L (ref 0.8–5.3)
LYMPHOCYTES NFR BLD AUTO: 17.7 %
MCH RBC QN AUTO: 31.4 PG (ref 26.5–33)
MCHC RBC AUTO-ENTMCNC: 32.8 G/DL (ref 31.5–36.5)
MCV RBC AUTO: 96 FL (ref 78–100)
MONOCYTES # BLD AUTO: 0.4 10E9/L (ref 0–1.3)
MONOCYTES NFR BLD AUTO: 8.7 %
NEUTROPHILS # BLD AUTO: 3.4 10E9/L (ref 1.6–8.3)
NEUTROPHILS NFR BLD AUTO: 71.9 %
NRBC # BLD AUTO: 0 10*3/UL
NRBC BLD AUTO-RTO: 0 /100
PLATELET # BLD AUTO: 212 10E9/L (ref 150–450)
POTASSIUM SERPL-SCNC: 3.5 MMOL/L (ref 3.4–5.3)
PROT SERPL-MCNC: 7.8 G/DL (ref 6.8–8.8)
RBC # BLD AUTO: 4.3 10E12/L (ref 3.8–5.2)
SODIUM SERPL-SCNC: 142 MMOL/L (ref 133–144)
WBC # BLD AUTO: 4.7 10E9/L (ref 4–11)

## 2018-11-06 PROCEDURE — 36415 COLL VENOUS BLD VENIPUNCTURE: CPT | Performed by: INTERNAL MEDICINE

## 2018-11-06 PROCEDURE — 86300 IMMUNOASSAY TUMOR CA 15-3: CPT | Performed by: INTERNAL MEDICINE

## 2018-11-06 PROCEDURE — 85025 COMPLETE CBC W/AUTO DIFF WBC: CPT | Performed by: INTERNAL MEDICINE

## 2018-11-06 PROCEDURE — 80053 COMPREHEN METABOLIC PANEL: CPT | Performed by: INTERNAL MEDICINE

## 2018-11-13 ENCOUNTER — ONCOLOGY VISIT (OUTPATIENT)
Dept: ONCOLOGY | Facility: CLINIC | Age: 48
End: 2018-11-13
Attending: INTERNAL MEDICINE
Payer: MEDICARE

## 2018-11-13 VITALS
DIASTOLIC BLOOD PRESSURE: 67 MMHG | WEIGHT: 115.3 LBS | SYSTOLIC BLOOD PRESSURE: 91 MMHG | OXYGEN SATURATION: 98 % | RESPIRATION RATE: 16 BRPM | HEART RATE: 77 BPM | TEMPERATURE: 97.7 F | BODY MASS INDEX: 21.22 KG/M2 | HEIGHT: 62 IN

## 2018-11-13 DIAGNOSIS — R59.9 ENLARGED LYMPH NODES: ICD-10-CM

## 2018-11-13 DIAGNOSIS — G62.9 NEUROPATHY: ICD-10-CM

## 2018-11-13 DIAGNOSIS — Z85.3 PERSONAL HISTORY OF MALIGNANT NEOPLASM OF BREAST: Primary | ICD-10-CM

## 2018-11-13 PROCEDURE — 99214 OFFICE O/P EST MOD 30 MIN: CPT | Performed by: INTERNAL MEDICINE

## 2018-11-13 ASSESSMENT — PAIN SCALES - GENERAL: PAINLEVEL: NO PAIN (1)

## 2018-11-13 NOTE — PATIENT INSTRUCTIONS
Dr. Thomas would like you to follow up in 6 months with labs prior to your appt.     When you are in need of a refill, please call your pharmacy and they will send us a request.      Copy of appointments, and after visit summary (AVS) given to patient.      If you have any questions please call Rubi Lacy RN, BSN Oncology Hematology  [facility] [phone]. For questions after business hours, or on holidays/weekends, please call our after hours Nurse Triage line [phone]. Thank you.             6 months f/u labs.

## 2018-11-13 NOTE — LETTER
"    11/13/2018         RE: Paola Alicea  [address]  [facility] [address]        Dear Colleague,    Thank you for referring your patient, Paola Alicea, to the [facility]. Please see a copy of my visit note below.    ONCOLOGY FOLLOW UP VISIT     BREAST SURGEON:  Dr. Bell Ulrich, Breast Surgeon at [facility].      REASON FOR CONSULTATION AND REASON FOR VISIT:  left breast cancer 2015, ER/PA-, Her 2 low +, s/p neoadjuvant chemo      HISTORY OF PRESENT ILLNESS: She presented with a palpable mass in the left breast for 3 weeks at age 45.  Diagnostic mammogram in 06/2015 identified a 3 cm mass in the upper outer quadrant of the left breast.  This is around at 1 o'clock position, 9 cm from the nipple.  Biopsy indicating ER/PA negative, HER-2 low positive, FISH ratio 2.2, copy number was 3.7.      Staging PET found hypermetabolic adenopathy in the mediastinum, bilateral hilar regions, left lateral posterior nasopharynx and oropharynx, small left axilla LN. EBUS by [facility] 8/2015 found non-necrotizing granulomas and negative for malignancy.     She was seen by Dr. Rodriguez and recommended neoadjuvant AC with wkly taxol , C1 AC 8/17/2015. Dr. Rodriguez requested further Her 2 testing at [place], who read is as \"+\".      Wkly T taxol 80 mg/m2, Pertuzumab and herceptin q 3 wks are offered after DD AC. She had severe diarrhea and hypkalemia, Pertuzumab was dropped after 2 cycles. She finished total 12 wks of wkly taxol and herceptin.     She had Left lumpectomy 3/1/2016 at [facility] to have 0.7 cm residual high grade IDC, TN, clear margin, 3 LNs are negative. Adjuvant herceptin is offered for 1 yr till 3/2016. S/p RT till 5/2016.       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  She subsequently had knee problems on the left side, eventually required amputation around 2011 through different steps of surgery.  Reflux disease, chronic pain syndrome, moderate major " "depression.      MEDICATIONS:  Reviewed in Epic system.      SOCIAL HISTORY:  She lives in [place].  She moved from [place] to [place] than from [place] to [place] close to her family.  She has her personal care agent.  She is not .  She has never been pregnant and she has no kids.  She lives by herself in an apartment.   She is very involved with community work.      FAMILY HISTORY:  No family history of breast or ovarian cancer or other cancer on the mother's side.  Father's side history is unreliable.      REVIEW OF SYSTEMS:   RHEUMATOLOGY/MUSCULOSKELETAL SYSTEM:  Above knee amputation of the left leg.  Chronic pain syndrome. Right hip replacement in 1/2018, has persistent pain after.        PHYSICAL EXAMINATION:   GENERAL APPEARANCE:  A middle-aged woman, looks like her stated age.  She is very on top of her medical history.     ECOG 0    VITAL SIGNS:  Blood pressure 91/67, pulse 77, temperature 97.7  F (36.5  C), temperature source Tympanic, resp. rate 16, height 1.575 m (5' 2\"), weight 52.3 kg (115 lb 4.8 oz), last menstrual period 08/07/2012, SpO2 98 %, not currently breastfeeding.  HEENT: The patient is normocephalic, atraumatic. Pupils are equally reactive to light.  Sclerae are anicteric.  Moist oral mucosa.  Negative pharynx.  No oral thrush.   NECK:  Supple.  No jugular venous distention.  Thyroid is not palpable.   LYMPH NODES:  Superficial lymphadenopathy is not appreciable in the bilateral cervical, supraclavicular, axillary or inguinal areas.   CARDIOVASCULAR:  S1, S2 regular with no murmurs or gallops.  No carotid or abdominal bruits.   PULMONARY:  Lungs are clear to auscultation and percussion bilaterally.  There is no wheezing or rhonchi.   GASTROINTESTINAL:  Abdomen is soft, nontender.  No hepatosplenomegaly.  No signs of ascites.  No mass appreciable.   MUSCULOSKELETAL/EXTREMITIES:  Left above the knee amputation for osteosarcoma  NEUROLOGIC:  Cranial nerves II-XII are " grossly intact.  Sensation intact.  Muscle strength and muscle tone symmetrical, 5/5 throughout.   BACK:  No spinal or paraspinal tenderness.  No CVA tenderness.   SKIN:  No petechiae.  No rash.  No signs of cellulitis. Diffuse dry skin.  BREASTS: well healed lumpectomy scar wihtout edema or ecchymoses.        CURRENT LABORATORY DATA REVIEWED  CMP/cbc diff/marker: nl    Current Image Reviewed  MA 8/2018 MA: negative    CT 2/2018   1. Previously described mediastinal and hilar nodes are either stable  or smaller, reference nodes as above.  2. No new pulmonary abnormality.      OLD DATA REVIEW WITH SUMMARY  MA at [facility] 8/2017 - negative    CT body 3/2017: There is worsening or mediastinal and hilar adenopathy.  There also is a new mildly prominent gastrohepatic ligament lymph node. She was referred to pulmonary at , tumor conference discussion no biopsy is recommended.     MA 9/2016 from [facility]: negative.     8/2015 EBUS at [facility] biopsy of station 4R, 11L, 7 - none malignancy, non-necrotizing granulomas.    BCRA 1/2 mutation from [facility] 10/2015: negative.     7/2015 [id number] baseline 52  Cbc diff/CMP are good.       PET 7/2015  1. Focal asymmetric hypermetabolism at the left lateral posterior nasopharynx and oropharynx, series 102 image 36 (SUV Max 9.4). There is no conspicuous mass lesion seen at this location.  2. Hypermetabolic adenopathy in the mediastinum, bilateral hilar regions, and suspected at the left axilla.   1)Small left axillary lymph node shows mild hypermetabolism and measures 1.6 x 1.0 cm series 3 image 104 (SUV Max 2.8). A few adjacent left axillary small lymph nodes has minimal elevated metabolism.  2) Left hilar adenopathy measures 1.3 x 0.8 cm, series 3 image 109 (SUV Max 9.3). Example of a right hilar adenopathy measures 1.7 x 1.1 cm, series 3 image 112 (SUV Max 11.3).   3) Example of mediastinal adenopathy at the subcarinal location measures 2.0 x 0.9 cm, series 3 image 115 (SUV Max 8.4).  There are numerous additional examples.  3. Left lateral breast mass is 3.2 x 3.0 cm, series 3 image 103 (SUV Max 16.4). Trace pericardial fluid.  4. Hazy ground-glass opacities and some interstitial prominence could represent pulmonary edema or other alveolar infiltrate. There is a small pericardial effusion.         ASSESSMENT AND PLAN:   1. She was diagnosed clinically T2 N1 MX  ER/SC -, Her 2 low + left breast upper outer quadrant of the left breast high-grade invasive ductal cancer 6/2015 s/p neoadjuvant S/p DD AC -- wkly taxol and herceptin partial pertuzumab (dropped due to severe diarrhea).   She had Left lumpectomy 3/1/2016 at [facility] to have 0.7 cm residual high grade IDC, TN, clear margin, 3 LNs are negative. Adjuvant herceptin is offered for 1 yr till 3/2016. S/p RT till 5/2016.     She is on  follow up 6  months for now.     Early diagnosis breast cancer younger than 50 years old.  She saw genetic at [facility] 8/25/2015, BRCA 1/2 tests was negative.    2. On going neuropathy on toes. She is on gabapentin. She reports this is better.     3. ROSSY mediastinum, bilateral hilar regions, left lateral posterior nasopharynx and oropharynx, small left axilla LN. EBUS by [facility] 8/2015 found non-necrotizing granulomas.   CT 3/2017 found worsening ROSSY in chest. Then improved in 2/2018.   Adviced pulmonary evaluation. She is not compliant on this.         Again, thank you for allowing me to participate in the care of your patient.        Sincerely,        Kelly Thomas MD, MD

## 2018-11-13 NOTE — MR AVS SNAPSHOT
After Visit Summary   11/13/2018    Paola Alicea    MRN: [id number]           Patient Information     Date Of Birth          1970        Visit Information        Provider Department      11/13/2018 1:30 PM Kelly Thomas MD [facility]        Today's Diagnoses     Personal history of malignant neoplasm of breast    -  1    Enlarged lymph nodes        Neuropathy          Care Instructions    6 months f/u labs.           Follow-ups after your visit        Your next 10 appointments already scheduled     Nov 13, 2018  1:30 PM CST   Return Visit with Kelly Thomas MD   [facility] ([facility])    [facility]  [address] [place] [id number]   [phone]            Jan 18, 2019  3:00 PM CST   PHYSICAL with Britni Gil MD   [facility]    [address] [place] [id number]   [phone]            May 07, 2019 10:40 AM CDT   LAB with [facility])    [address] [place] [address]   [phone]           Please do not eat 10-12 hours before your appointment if you are coming in fasting for labs on lipids, cholesterol, or glucose (sugar). This does not apply to pregnant women. Water, hot tea and black coffee (with nothing added) are okay. Do not drink other fluids, diet soda or chew gum.            May 14, 2019 10:45 AM CDT   Return Visit with Kelly Thomas MD   [facility] ([facility])    [address] [address]   [phone]              Future tests that were ordered for you today     Open Future Orders        Priority Expected Expires Ordered    Comprehensive metabolic panel Routine 4/1/2019 6/30/2019 11/13/2018    CBC with platelets differential Routine 4/1/2019 6/30/2019 11/13/2018    Ca27.29  breast tumor marker  "Routine 4/1/2019 6/30/2019 11/13/2018            Who to contact     If you have questions or need follow up information about today's clinic visit or your schedule please contact [facility] directly at [phone].  Normal or non-critical lab and imaging results will be communicated to you by MyChart, letter or phone within 4 business days after the clinic has received the results. If you do not hear from us within 7 days, please contact the clinic through MyChart or phone. If you have a critical or abnormal lab result, we will notify you by phone as soon as possible.  Submit refill requests through [employer] or call your pharmacy and they will forward the refill request to us. Please allow 3 business days for your refill to be completed.          Additional Information About Your Visit        Care EveryWhere ID     This is your Care EveryWhere ID. This could be used by other organizations to access your [place] medical records  [id number]        Your Vitals Were     Pulse Temperature Respirations Height Last Period Pulse Oximetry    77 97.7  F (36.5  C) (Tympanic) 16 1.575 m (5' 2\") 08/07/2012 98%    Breastfeeding? BMI (Body Mass Index)                No 21.09 kg/m2           Blood Pressure from Last 3 Encounters:   11/13/18 91/67   05/15/18 115/73   11/14/17 (!) 99/38    Weight from Last 3 Encounters:   11/13/18 52.3 kg (115 lb 4.8 oz)   05/15/18 53.6 kg (118 lb 1.6 oz)   11/14/17 54.6 kg (120 lb 6.4 oz)               Primary Care Provider Office Phone # Fax #    Ibeth Beltran Steiner [phone]       [facility] [address] [facility] [place] [address]        Equal Access to Services     LEONIDES CHAIREZ AH: Bacilio He, raine swanson, darya taveras [facility] [phone].    ATENCIÓN: Si habla español, tiene a aviles disposición servicios gratuitos de asistencia lingüística. Llame al [phone].    We comply with " applicable federal civil rights laws and [place] laws. We do not discriminate on the basis of race, color, national origin, age, disability, sex, sexual orientation, or gender identity.            Thank you!     Thank you for choosing [facility]  for your care. Our goal is always to provide you with excellent care. Hearing back from our patients is one way we can continue to improve our services. Please take a few minutes to complete the written survey that you may receive in the mail after your visit with us. Thank you!             Your Updated Medication List - Protect others around you: Learn how to safely use, store and throw away your medicines at www.disposemymeds.org.          This list is accurate as of 11/13/18 12:17 PM.  Always use your most recent med list.                   Brand Name Dispense Instructions for use Diagnosis    ascorbic acid 1000 MG Tabs    vitamin C     Take 8 oz by mouth        FLECTOR 1.3 % Patch   Generic drug:  diclofenac       Breast cancer, left (H)       ibuprofen 600 MG tablet    ADVIL/MOTRIN          metoprolol succinate 12.5 mg Tabs half-tab    TOPROL-XL     Take 12.5 mg by mouth daily        NEURONTIN 300 MG capsule   Generic drug:  gabapentin      Take 900 mg by mouth 2 times daily        omeprazole 20 MG CR capsule    priLOSEC     Take 20 mg by mouth daily.        order for DME      Hospital bed with rails for home use. Length of need: 99    Breast cancer, left (H)       SF 1.1 % Gel topical gel   Generic drug:  sodium fluoride dental gel      USE TO BRUSH TEETH DAILY AT BEDTIME, EXPECTORATE-DO NOT RINSE.    Personal history of malignant neoplasm of breast, Insomnia, unspecified type, Neuropathy, Abnormal CT scan       Vitamin B-12 1000 MCG/15ML Liqd      Take 8 oz by mouth 2 times daily        Walker Auto Glides Misc      4 Wheeled Walker with Seat and Brakes for home use. For indefinite use    Breast cancer, left (H)

## 2018-11-13 NOTE — PROGRESS NOTES
"ONCOLOGY FOLLOW UP VISIT     BREAST SURGEON:  Dr. Bell Ulrich, Breast Surgeon at [facility].      REASON FOR CONSULTATION AND REASON FOR VISIT:  left breast cancer 2015, ER/DC-, Her 2 low +, s/p neoadjuvant chemo      HISTORY OF PRESENT ILLNESS: She presented with a palpable mass in the left breast for 3 weeks at age 45.  Diagnostic mammogram in 06/2015 identified a 3 cm mass in the upper outer quadrant of the left breast.  This is around at 1 o'clock position, 9 cm from the nipple.  Biopsy indicating ER/DC negative, HER-2 low positive, FISH ratio 2.2, copy number was 3.7.      Staging PET found hypermetabolic adenopathy in the mediastinum, bilateral hilar regions, left lateral posterior nasopharynx and oropharynx, small left axilla LN. EBUS by [facility] 8/2015 found non-necrotizing granulomas and negative for malignancy.     She was seen by Dr. Rodriguez and recommended neoadjuvant AC with wkly taxol , C1 AC 8/17/2015. Dr. Rodriguez requested further Her 2 testing at [place], who read is as \"+\".      Wkly T taxol 80 mg/m2, Pertuzumab and herceptin q 3 wks are offered after DD AC. She had severe diarrhea and hypkalemia, Pertuzumab was dropped after 2 cycles. She finished total 12 wks of wkly taxol and herceptin.     She had Left lumpectomy 3/1/2016 at [facility] to have 0.7 cm residual high grade IDC, TN, clear margin, 3 LNs are negative. Adjuvant herceptin is offered for 1 yr till 3/2016. S/p RT till 5/2016.       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  She subsequently had knee problems on the left side, eventually required amputation around 2011 through different steps of surgery.  Reflux disease, chronic pain syndrome, moderate major depression.      MEDICATIONS:  Reviewed in Epic system.      SOCIAL HISTORY:  She lives in [place].  She moved from [place] to [place] than from [place] to [place] close to her family.  She has her personal care agent.  She is " "not .  She has never been pregnant and she has no kids.  She lives by herself in an apartment.   She is very involved with community work.      FAMILY HISTORY:  No family history of breast or ovarian cancer or other cancer on the mother's side.  Father's side history is unreliable.      REVIEW OF SYSTEMS:   RHEUMATOLOGY/MUSCULOSKELETAL SYSTEM:  Above knee amputation of the left leg.  Chronic pain syndrome. Right hip replacement in 1/2018, has persistent pain after.        PHYSICAL EXAMINATION:   GENERAL APPEARANCE:  A middle-aged woman, looks like her stated age.  She is very on top of her medical history.     ECOG 0    VITAL SIGNS:  Blood pressure 91/67, pulse 77, temperature 97.7  F (36.5  C), temperature source Tympanic, resp. rate 16, height 1.575 m (5' 2\"), weight 52.3 kg (115 lb 4.8 oz), last menstrual period 08/07/2012, SpO2 98 %, not currently breastfeeding.  HEENT: The patient is normocephalic, atraumatic. Pupils are equally reactive to light.  Sclerae are anicteric.  Moist oral mucosa.  Negative pharynx.  No oral thrush.   NECK:  Supple.  No jugular venous distention.  Thyroid is not palpable.   LYMPH NODES:  Superficial lymphadenopathy is not appreciable in the bilateral cervical, supraclavicular, axillary or inguinal areas.   CARDIOVASCULAR:  S1, S2 regular with no murmurs or gallops.  No carotid or abdominal bruits.   PULMONARY:  Lungs are clear to auscultation and percussion bilaterally.  There is no wheezing or rhonchi.   GASTROINTESTINAL:  Abdomen is soft, nontender.  No hepatosplenomegaly.  No signs of ascites.  No mass appreciable.   MUSCULOSKELETAL/EXTREMITIES:  Left above the knee amputation for osteosarcoma  NEUROLOGIC:  Cranial nerves II-XII are grossly intact.  Sensation intact.  Muscle strength and muscle tone symmetrical, 5/5 throughout.   BACK:  No spinal or paraspinal tenderness.  No CVA tenderness.   SKIN:  No petechiae.  No rash.  No signs of cellulitis. Diffuse dry " skin.  BREASTS: well healed lumpectomy scar wihtout edema or ecchymoses.        CURRENT LABORATORY DATA REVIEWED  CMP/cbc diff/marker: nl    Current Image Reviewed  MA 8/2018 MA: negative    CT 2/2018   1. Previously described mediastinal and hilar nodes are either stable  or smaller, reference nodes as above.  2. No new pulmonary abnormality.      OLD DATA REVIEW WITH SUMMARY  MA at [facility] 8/2017 - negative    CT body 3/2017: There is worsening or mediastinal and hilar adenopathy.  There also is a new mildly prominent gastrohepatic ligament lymph node. She was referred to pulmonary at , tumor conference discussion no biopsy is recommended.     MA 9/2016 from [place]: negative.     8/2015 EBUS at [facility] biopsy of station 4R, 11L, 7 - none malignancy, non-necrotizing granulomas.    BCRA 1/2 mutation from [facility] 10/2015: negative.     7/2015 [id number] baseline 52  Cbc diff/CMP are good.       PET 7/2015  1. Focal asymmetric hypermetabolism at the left lateral posterior nasopharynx and oropharynx, series 102 image 36 (SUV Max 9.4). There is no conspicuous mass lesion seen at this location.  2. Hypermetabolic adenopathy in the mediastinum, bilateral hilar regions, and suspected at the left axilla.   1)Small left axillary lymph node shows mild hypermetabolism and measures 1.6 x 1.0 cm series 3 image 104 (SUV Max 2.8). A few adjacent left axillary small lymph nodes has minimal elevated metabolism.  2) Left hilar adenopathy measures 1.3 x 0.8 cm, series 3 image 109 (SUV Max 9.3). Example of a right hilar adenopathy measures 1.7 x 1.1 cm, series 3 image 112 (SUV Max 11.3).   3) Example of mediastinal adenopathy at the subcarinal location measures 2.0 x 0.9 cm, series 3 image 115 (SUV Max 8.4). There are numerous additional examples.  3. Left lateral breast mass is 3.2 x 3.0 cm, series 3 image 103 (SUV Max 16.4). Trace pericardial fluid.  4. Hazy ground-glass opacities and some interstitial prominence could represent  pulmonary edema or other alveolar infiltrate. There is a small pericardial effusion.         ASSESSMENT AND PLAN:   1. She was diagnosed clinically T2 N1 MX  ER/[place] -, Her 2 low + left breast upper outer quadrant of the left breast high-grade invasive ductal cancer 6/2015 s/p neoadjuvant S/p DD AC -- wkly taxol and herceptin partial pertuzumab (dropped due to severe diarrhea).   She had Left lumpectomy 3/1/2016 at [facility] to have 0.7 cm residual high grade IDC, TN, clear margin, 3 LNs are negative. Adjuvant herceptin is offered for 1 yr till 3/2016. S/p RT till 5/2016.     She is on  follow up 6  months for now.     Early diagnosis breast cancer younger than 50 years old.  She saw genetic at [facility] 8/25/2015, BRCA 1/2 tests was negative.    2. On going neuropathy on toes. She is on gabapentin. She reports this is better.     3. ROSSY mediastinum, bilateral hilar regions, left lateral posterior nasopharynx and oropharynx, small left axilla LN. EBUS by [facility] 8/2015 found non-necrotizing granulomas.   CT 3/2017 found worsening ROSSY in chest. Then improved in 2/2018.   Adviced pulmonary evaluation. She is not compliant on this.

## 2019-01-18 ENCOUNTER — OFFICE VISIT (OUTPATIENT)
Dept: OBGYN | Facility: CLINIC | Age: 49
End: 2019-01-18
Payer: MEDICARE

## 2019-01-18 VITALS
HEIGHT: 62 IN | SYSTOLIC BLOOD PRESSURE: 110 MMHG | DIASTOLIC BLOOD PRESSURE: 80 MMHG | RESPIRATION RATE: 18 BRPM | HEART RATE: 80 BPM | BODY MASS INDEX: 21.71 KG/M2 | WEIGHT: 118 LBS

## 2019-01-18 DIAGNOSIS — Z87.310 PERSONAL HISTORY OF HEALED OSTEOPOROSIS FRACTURE: ICD-10-CM

## 2019-01-18 DIAGNOSIS — Z01.419 ENCOUNTER FOR GYNECOLOGICAL EXAMINATION WITHOUT ABNORMAL FINDING: Primary | ICD-10-CM

## 2019-01-18 DIAGNOSIS — Z13.6 CARDIOVASCULAR SCREENING; LDL GOAL LESS THAN 160: ICD-10-CM

## 2019-01-18 PROCEDURE — 99396 PREV VISIT EST AGE 40-64: CPT | Performed by: OBSTETRICS & GYNECOLOGY

## 2019-01-18 ASSESSMENT — MIFFLIN-ST. JEOR: SCORE: 1118.49

## 2019-01-18 NOTE — PROGRESS NOTES
SUBJECTIVE:   CC: Paola Alicea is an 48 year old woman who presents for preventive health visit.     Healthy Habits:    Do you get at least three servings of calcium containing foods daily (dairy, green leafy vegetables, etc.)? yes    Amount of exercise or daily activities, outside of work: 3 day(s) per week    Problems taking medications regularly No    Medication side effects: No    Have you had an eye exam in the past two years? yes    Do you see a dentist twice per year? yes    Do you have sleep apnea, excessive snoring or daytime drowsiness?yes          Today's PHQ-2 Score:   PHQ-2 ( 1999 [employer]) 1/18/2019 7/5/2017   Q1: Little interest or pleasure in doing things 0 0   Q2: Feeling down, depressed or hopeless 0 0   PHQ-2 Score 0 0       Abuse: Current or Past(Physical, Sexual or Emotional)- No  Do you feel safe in your environment? Yes    Social History     Tobacco Use     Smoking status: Never Smoker     Smokeless tobacco: Never Used   Substance Use Topics     Alcohol use: No     If you drink alcohol do you typically have >3 drinks per day or >7 drinks per week? No                     Reviewed orders with patient.  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     Moderate major depression (H)     Chronic pain syndrome     Esophageal reflux     Migraine     Osteosarcoma of bone (H)     S/P above knee amputation (H)     Generalized muscle weakness     Sinus tachycardia     Drug-induced neutropenia (H)     Nausea with vomiting     Malignant neoplasm of upper-outer quadrant of left female breast  (H)     Hypokalemia     Diarrhea     Nausea     Dehydration     Past Surgical History:   Procedure Laterality Date     AMPUTATION  2004    stump revision     COLONOSCOPY       DILATION AND CURETTAGE, HYSTEROSCOPY, ABLATE ENDOMETRIUM THERMACHOICE, COMBINED  5/29/2012    hysteroscopy, with uterine perforation     DISARTICULATE HIP  1/9/2012    Procedure:DISARTICULATE HIP; Left Hip Disarticulation; Surgeon:CORNELL BRASHER; Location: OR     ESOPHAGOSCOPY, GASTROSCOPY, DUODENOSCOPY (EGD), COMBINED       EYE SURGERY       HYSTERECTOMY, PAP NO LONGER INDICATED      LAVH-BSO     LAPAROSCOPIC ASSISTED HYSTERECTOMY VAGINAL  8/21/2012    Procedure: LAPAROSCOPIC ASSISTED HYSTERECTOMY VAGINAL;  Laparoscopic assisted vaginal hysterectomy,left salpingo ophorectomy, possible abdominal hysterectomy;  Surgeon: Britni Gil MD;  Location: [facility] OR     LAPAROSCOPIC CHOLECYSTECTOMY  9/15/2011    Procedure:LAPAROSCOPIC CHOLECYSTECTOMY; Laparoscopic Cholecystectomy; Surgeon:DEMETRIS AMAYA; Location:[facility] OR     ORTHOPEDIC SURGERY  2003    , left leg amputation, Hx knee replacement     right oopherectomy         Social History     Tobacco Use     Smoking status: Never Smoker     Smokeless tobacco: Never Used   Substance Use Topics     Alcohol use: No     Family History   Problem Relation Age of Onset     Unknown/Adopted Mother      Unknown/Adopted Father      Other Cancer Father            Mammogram Screening: Patient with h/o breast cancer      Pertinent mammograms are reviewed under the imaging tab.  History of abnormal Pap smear: Status post benign hysterectomy. Health Maintenance and Surgical History updated.  PAP / HPV 6/22/2011   PAP NIL   PAP DATE - [facility] 06/22/2011     Reviewed and updated as needed this visit by clinical staff  Tobacco  Allergies  Meds         Reviewed and updated as needed this visit by Provider            ROS:  CONSTITUTIONAL: NEGATIVE for fever, chills, change in weight  INTEGUMENTARY/SKIN:  "NEGATIVE for worrisome rashes, moles or lesions  EYES: NEGATIVE for vision changes or irritation  ENT: NEGATIVE for ear, mouth and throat problems  RESP: NEGATIVE for significant cough or SOB  BREAST: NEGATIVE for masses, tenderness or discharge  CV: NEGATIVE for chest pain, palpitations or peripheral edema  GI: NEGATIVE for nausea, abdominal pain, heartburn, or change in bowel habits  : NEGATIVE for unusual urinary or vaginal symptoms. No vaginal bleeding.  NEURO: NEGATIVE for weakness, dizziness or paresthesias  PSYCHIATRIC: NEGATIVE for changes in mood or affect     OBJECTIVE:   /80 (BP Location: Right arm, Patient Position: Chair, Cuff Size: Adult Small)   Pulse 80   Resp 18   Ht 1.575 m (5' 2\")   Wt 53.5 kg (118 lb)   LMP 08/07/2012   BMI 21.58 kg/m    EXAM:  GENERAL APPEARANCE: healthy, alert and no distress  EYES: Eyes grossly normal to inspection, PERRL and conjunctivae and sclerae normal  HENT: ear canals and TM's normal, nose and mouth without ulcers or lesions, oropharynx clear and oral mucous membranes moist  NECK: no adenopathy, no asymmetry, masses, or scars and thyroid normal to palpation  RESP: lungs clear to auscultation - no rales, rhonchi or wheezes  BREAST: normal without masses, tenderness or nipple discharge and no palpable axillary masses or adenopathy  CV: regular rate and rhythm, normal S1 S2, no S3 or S4, no murmur, click or rub, no peripheral edema and peripheral pulses strong  ABDOMEN: soft, nontender, no hepatosplenomegaly, no masses and bowel sounds normal   (female): normal female external genitalia, normal urethral meatus, vaginal mucosal atrophy noted and normal post-hysterectomy exam without masses.   MS: absent left leg; wheel chair  SKIN: no suspicious lesions or rashes  NEURO: Normal strength and tone, sensory exam grossly normal, mentation intact and speech normal  PSYCH: mentation appears normal and affect normal/bright    Diagnostic Test Results:  none " "    ASSESSMENT/PLAN:       ICD-10-CM    1. Encounter for gynecological examination without abnormal finding Z01.419    2. Wheel chair as ambulatory aid Z99.3 DX Hip/Pelvis/Spine   3. Personal history of healed osteoporosis fracture  Z87.310 DX Hip/Pelvis/Spine   4. CARDIOVASCULAR SCREENING; LDL GOAL LESS THAN 160 Z13.6 Lipid panel reflex to direct LDL Fasting       COUNSELING:   Reviewed preventive health counseling, as reflected in patient instructions  Special attention given to:        Healthy diet/nutrition       Vision screening       Osteoporosis Prevention/Bone Health    BP Readings from Last 1 Encounters:   01/18/19 110/80     Estimated body mass index is 21.58 kg/m  as calculated from the following:    Height as of this encounter: 1.575 m (5' 2\").    Weight as of this encounter: 53.5 kg (118 lb).           reports that  has never smoked. she has never used smokeless tobacco.      Counseling Resources:  ATP IV Guidelines  Pooled Cohorts Equation Calculator  Breast Cancer Risk Calculator  FRAX Risk Assessment  ICSI Preventive Guidelines  Dietary Guidelines for Americans, 2010  USDA's MyPlate  ASA Prophylaxis  Lung CA Screening    Britni Gil MD  [facility]  "

## 2019-01-26 ENCOUNTER — APPOINTMENT (OUTPATIENT)
Dept: GENERAL RADIOLOGY | Facility: CLINIC | Age: 49
End: 2019-01-26
Payer: MEDICARE

## 2019-01-26 ENCOUNTER — APPOINTMENT (OUTPATIENT)
Dept: CT IMAGING | Facility: CLINIC | Age: 49
End: 2019-01-26
Payer: MEDICARE

## 2019-01-26 ENCOUNTER — HOSPITAL ENCOUNTER (EMERGENCY)
Facility: CLINIC | Age: 49
Discharge: HOME | End: 2019-01-26
Attending: EMERGENCY MEDICINE | Admitting: EMERGENCY MEDICINE
Payer: MEDICARE

## 2019-01-26 VITALS
TEMPERATURE: 97.9 F | DIASTOLIC BLOOD PRESSURE: 75 MMHG | HEART RATE: 95 BPM | BODY MASS INDEX: 21.03 KG/M2 | OXYGEN SATURATION: 97 % | SYSTOLIC BLOOD PRESSURE: 101 MMHG | WEIGHT: 115 LBS

## 2019-01-26 DIAGNOSIS — R10.31 ABDOMINAL PAIN, RIGHT LOWER QUADRANT: ICD-10-CM

## 2019-01-26 DIAGNOSIS — N20.1 RIGHT URETERAL CALCULUS: ICD-10-CM

## 2019-01-26 LAB
ALBUMIN UR-MCNC: NEGATIVE MG/DL
ANION GAP SERPL CALCULATED.3IONS-SCNC: 9 MMOL/L (ref 3–14)
APPEARANCE UR: CLEAR
BACTERIA #/AREA URNS HPF: ABNORMAL /HPF
BASOPHILS # BLD AUTO: 0 10E9/L (ref 0–0.2)
BASOPHILS NFR BLD AUTO: 0.4 %
BILIRUB UR QL STRIP: NEGATIVE
BUN SERPL-MCNC: 11 MG/DL (ref 7–30)
CALCIUM SERPL-MCNC: 8.6 MG/DL (ref 8.5–10.1)
CHLORIDE SERPL-SCNC: 103 MMOL/L (ref 94–109)
CO2 SERPL-SCNC: 23 MMOL/L (ref 20–32)
COLOR UR AUTO: ABNORMAL
CREAT SERPL-MCNC: 0.73 MG/DL (ref 0.52–1.04)
DIFFERENTIAL METHOD BLD: ABNORMAL
EOSINOPHIL # BLD AUTO: 0 10E9/L (ref 0–0.7)
EOSINOPHIL NFR BLD AUTO: 0 %
ERYTHROCYTE [DISTWIDTH] IN BLOOD BY AUTOMATED COUNT: 11.9 % (ref 10–15)
GFR SERPL CREATININE-BSD FRML MDRD: >90 ML/MIN/{1.73_M2}
GLUCOSE SERPL-MCNC: 95 MG/DL (ref 70–99)
GLUCOSE UR STRIP-MCNC: NEGATIVE MG/DL
HCT VFR BLD AUTO: 42.6 % (ref 35–47)
HGB BLD-MCNC: 14.5 G/DL (ref 11.7–15.7)
HGB UR QL STRIP: ABNORMAL
IMM GRANULOCYTES # BLD: 0.1 10E9/L (ref 0–0.4)
IMM GRANULOCYTES NFR BLD: 0.6 %
KETONES UR STRIP-MCNC: 20 MG/DL
LEUKOCYTE ESTERASE UR QL STRIP: NEGATIVE
LYMPHOCYTES # BLD AUTO: 0.4 10E9/L (ref 0.8–5.3)
LYMPHOCYTES NFR BLD AUTO: 4.4 %
MCH RBC QN AUTO: 32 PG (ref 26.5–33)
MCHC RBC AUTO-ENTMCNC: 34 G/DL (ref 31.5–36.5)
MCV RBC AUTO: 94 FL (ref 78–100)
MONOCYTES # BLD AUTO: 0.3 10E9/L (ref 0–1.3)
MONOCYTES NFR BLD AUTO: 2.9 %
MUCOUS THREADS #/AREA URNS LPF: PRESENT /LPF
NEUTROPHILS # BLD AUTO: 8.2 10E9/L (ref 1.6–8.3)
NEUTROPHILS NFR BLD AUTO: 91.7 %
NITRATE UR QL: NEGATIVE
NRBC # BLD AUTO: 0 10*3/UL
NRBC BLD AUTO-RTO: 0 /100
PH UR STRIP: 7 PH (ref 5–7)
PLATELET # BLD AUTO: 159 10E9/L (ref 150–450)
POTASSIUM SERPL-SCNC: 3.7 MMOL/L (ref 3.4–5.3)
RBC # BLD AUTO: 4.53 10E12/L (ref 3.8–5.2)
RBC #/AREA URNS AUTO: 8 /HPF (ref 0–2)
SODIUM SERPL-SCNC: 135 MMOL/L (ref 133–144)
SOURCE: ABNORMAL
SP GR UR STRIP: 1 (ref 1–1.03)
UROBILINOGEN UR STRIP-MCNC: 0 MG/DL (ref 0–2)
WBC # BLD AUTO: 8.9 10E9/L (ref 4–11)
WBC #/AREA URNS AUTO: 2 /HPF (ref 0–5)

## 2019-01-26 PROCEDURE — A9270 NON-COVERED ITEM OR SERVICE: HCPCS | Mod: GY | Performed by: EMERGENCY MEDICINE

## 2019-01-26 PROCEDURE — 25000132 ZZH RX MED GY IP 250 OP 250 PS 637: Mod: GY | Performed by: EMERGENCY MEDICINE

## 2019-01-26 PROCEDURE — 99285 EMERGENCY DEPT VISIT HI MDM: CPT | Mod: 25 | Performed by: EMERGENCY MEDICINE

## 2019-01-26 PROCEDURE — 81001 URINALYSIS AUTO W/SCOPE: CPT | Performed by: EMERGENCY MEDICINE

## 2019-01-26 PROCEDURE — 74176 CT ABD & PELVIS W/O CONTRAST: CPT

## 2019-01-26 PROCEDURE — 36415 COLL VENOUS BLD VENIPUNCTURE: CPT | Performed by: EMERGENCY MEDICINE

## 2019-01-26 PROCEDURE — 80048 BASIC METABOLIC PNL TOTAL CA: CPT | Performed by: EMERGENCY MEDICINE

## 2019-01-26 PROCEDURE — 85025 COMPLETE CBC W/AUTO DIFF WBC: CPT | Performed by: EMERGENCY MEDICINE

## 2019-01-26 PROCEDURE — 99284 EMERGENCY DEPT VISIT MOD MDM: CPT | Mod: Z6 | Performed by: EMERGENCY MEDICINE

## 2019-01-26 PROCEDURE — 74019 RADEX ABDOMEN 2 VIEWS: CPT

## 2019-01-26 RX ORDER — GABAPENTIN 300 MG/1
1 CAPSULE ORAL 3 TIMES DAILY
Refills: 3 | COMMUNITY
Start: 2019-01-10

## 2019-01-26 RX ORDER — CHLORHEXIDINE GLUCONATE ORAL RINSE 1.2 MG/ML
15 SOLUTION DENTAL 3 TIMES DAILY
Refills: 2 | COMMUNITY
Start: 2019-01-10

## 2019-01-26 RX ORDER — GABAPENTIN 600 MG/1
600 TABLET ORAL 3 TIMES DAILY
COMMUNITY
Start: 2018-10-23

## 2019-01-26 RX ORDER — OLANZAPINE 5 MG/1
5 TABLET, ORALLY DISINTEGRATING ORAL
Status: COMPLETED | OUTPATIENT
Start: 2019-01-26 | End: 2019-01-26

## 2019-01-26 RX ADMIN — OLANZAPINE 5 MG: 5 TABLET, ORALLY DISINTEGRATING ORAL at 12:57

## 2019-01-26 ASSESSMENT — ENCOUNTER SYMPTOMS
ENDOCRINE NEGATIVE: 1
HEMATOLOGIC/LYMPHATIC NEGATIVE: 1
PSYCHIATRIC NEGATIVE: 1
MUSCULOSKELETAL NEGATIVE: 1
EYES NEGATIVE: 1
NEUROLOGICAL NEGATIVE: 1
CARDIOVASCULAR NEGATIVE: 1
ALLERGIC/IMMUNOLOGIC NEGATIVE: 1
RESPIRATORY NEGATIVE: 1
ABDOMINAL PAIN: 1
CONSTITUTIONAL NEGATIVE: 1

## 2019-01-26 NOTE — ED AVS SNAPSHOT
[facility] Emergency Department  [address] [address]  Phone:  [phone]  Fax:  [phone]                                    Paola Alicea   MRN: [id number]    Department:  [facility] Emergency Department   Date of Visit:  1/26/2019           After Visit Summary Signature Page    I have received my discharge instructions, and my questions have been answered. I have discussed any challenges I see with this plan with the nurse or doctor.    ..........................................................................................................................................  Patient/Patient Representative Signature      ..........................................................................................................................................  Patient Representative Print Name and Relationship to Patient    ..................................................               ................................................  Date                                   Time    ..........................................................................................................................................  Reviewed by Signature/Title    ...................................................              ..............................................  Date                                               Time          22EPIC Rev 08/18

## 2019-01-26 NOTE — ED PROVIDER NOTES
"  History     Chief Complaint   Patient presents with     Abdominal Pain     rlq pain since this morning, vomit x1, no fever, no diarrhea     HPI  Paola Alicea is a 48 year old female who presents to the ED via EMS for evaluation of abdominal pain. The patient is agitated and reluctant to provide history. She reports that she began experiencing severe, sharp right lower quadrant abdominal pain when she woke up this morning. When asked if she had taken anything for pain prior to ED arrival, the patient shouted \"if something she took for pain helped she wouldn't be here\" and did not specify what she has taken. The patient underwent a cholecystectomy and hysterectomy in 2012. She denies any other abdominal surgeries. The patient regularly takes gabapentin for phantom pain. No other use of prescription medications.  The patient adds that she is \"going to walk out if you don't figure it out\".     Social History: The patient lives in [place], [place]. She presents alone to the ED via EMS. She lives alone.       Allergies:  Allergies   Allergen Reactions     Ondansetron Other (See Comments)     Seizures, GTC     Dilaudid [Hydromorphone] Itching     Liquid Adhesive Other (See Comments)     Sensitive to paper tape only/redness     Morphine Sulfate Itching     No Clinical Screening - See Comments Other (See Comments)     Paper tape     Zofran Odt Other (See Comments)     \"20 minute [place] seizure\"     Darvocet [Propoxyphene N-Apap] Itching and Rash     Iodine Rash     Topical iodine --> rash.     Iodine I 131 Tositumomab Rash     Topical only     Morphine Rash and Itching     Percocet [Oxycodone-Acetaminophen] Itching and Rash     Tylenol W/Codeine [Acetaminophen-Codeine] Itching and Rash     Vicodin [Hydrocodone-Acetaminophen] Itching and Rash       Problem List:    Patient Active Problem List    Diagnosis Date Noted     Nausea 12/14/2015     Priority: Medium     Dehydration 12/14/2015     Priority: Medium     Diarrhea " 11/10/2015     Priority: Medium     Hypokalemia 11/09/2015     Priority: Medium     Nausea with vomiting 10/05/2015     Priority: Medium     Drug-induced neutropenia (H) 08/31/2015     Priority: Medium     updating diagnosis code for icd10 cutover       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  Biopsy indicating ER/GA negative, HER-2 low positive, FISH ratio 2.2, copy number was 3.7.          Generalized muscle weakness 02/02/2014     Priority: Medium     Sinus tachycardia 02/02/2014     Priority: Medium     Osteosarcoma of bone (H) 01/27/2014     Priority: Medium     S/P above knee amputation (H) 01/27/2014     Priority: Medium     Chronic pain syndrome 01/16/2014     Priority: Medium     Esophageal reflux 01/16/2014     Priority: Medium     Migraine 01/16/2014     Priority: Medium     Problem list name updated by automated process. 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     Priority: Medium        Past Medical History:    Past Medical History:   Diagnosis Date     Depression      DUB (dysfunctional uterine bleeding) 4/6/2012     GERD (gastroesophageal reflux disease)      Osteosarcoma (H) 2003     Sinus tachycardia 2/2/2014     Tachycardia      Unilateral AKA (H)        Past Surgical History:    Past Surgical History:   Procedure Laterality Date     AMPUTATION  2004    stump revision     COLONOSCOPY       DILATION AND CURETTAGE, HYSTEROSCOPY, ABLATE ENDOMETRIUM THERMACHOICE, COMBINED  5/29/2012    hysteroscopy, with uterine perforation     DISARTICULATE HIP  1/9/2012    Procedure:DISARTICULATE HIP; Left Hip Disarticulation; Surgeon:CORNELL BRASHER; Location:[facility]     ESOPHAGOSCOPY, GASTROSCOPY, DUODENOSCOPY (EGD),  COMBINED       EYE SURGERY       HYSTERECTOMY, PAP NO LONGER INDICATED      LAVH-BSO     LAPAROSCOPIC ASSISTED HYSTERECTOMY VAGINAL  8/21/2012    Procedure: LAPAROSCOPIC ASSISTED HYSTERECTOMY VAGINAL;  Laparoscopic assisted vaginal hysterectomy,left salpingo ophorectomy, possible abdominal hysterectomy;  Surgeon: Britni Gil MD;  Location: [facility]     LAPAROSCOPIC CHOLECYSTECTOMY  9/15/2011    Procedure:LAPAROSCOPIC CHOLECYSTECTOMY; Laparoscopic Cholecystectomy; Surgeon:DEMETRIS AMAYA; Location:[facility]     ORTHOPEDIC SURGERY  2003    , left leg amputation, Hx knee replacement     right oopherectomy         Family History:    Family History   Problem Relation Age of Onset     Unknown/Adopted Mother      Unknown/Adopted Father      Other Cancer Father        Social History:  Marital Status:  Single [1]  Social History     Tobacco Use     Smoking status: Never Smoker     Smokeless tobacco: Never Used   Substance Use Topics     Alcohol use: No     Drug use: No        Medications:      ascorbic acid (VITAMIN C) 1000 MG TABS   chlorhexidine (PERIDEX) 0.12 % solution   Cholecalciferol (VITAMIN D3) 18858 units TABS   Cyanocobalamin (VITAMIN B-12) 1000 MCG/15ML LIQD   gabapentin (NEURONTIN) 600 MG tablet   ibuprofen (ADVIL,MOTRIN) 600 MG tablet   omeprazole (PRILOSEC) 20 MG capsule   SF 1.1 % GEL topical gel   gabapentin (NEURONTIN) 300 MG capsule   order for DME         Review of Systems   Constitutional: Negative.    HENT: Negative.    Eyes: Negative.    Respiratory: Negative.    Cardiovascular: Negative.    Gastrointestinal: Positive for abdominal pain.   Endocrine: Negative.    Genitourinary: Negative.    Musculoskeletal: Negative.    Skin: Negative.    Allergic/Immunologic: Negative.    Neurological: Negative.    Hematological: Negative.    Psychiatric/Behavioral: Negative.    All other systems reviewed and are negative.      Physical Exam   BP: 98/48  Pulse: 64  Temp: 97.9  F (36.6  C)  Weight: 52.2 kg (115  lb)  SpO2: 100 %      Physical Exam   Constitutional: She is oriented to person, place, and time. She appears well-developed and well-nourished.   HENT:   Head: Normocephalic and atraumatic.   Eyes: EOM are normal. Pupils are equal, round, and reactive to light.   Neck: Normal range of motion. Neck supple. No JVD present. No tracheal deviation present. No thyromegaly present.   Cardiovascular: Normal rate. Exam reveals no friction rub.   No murmur heard.  Pulmonary/Chest: Effort normal and breath sounds normal. No stridor. No respiratory distress. She has no wheezes. She has no rales. She exhibits no tenderness.   Abdominal: There is tenderness.       Lymphadenopathy:     She has no cervical adenopathy.   Neurological: She is alert and oriented to person, place, and time. She displays normal reflexes. No cranial nerve deficit or sensory deficit. She exhibits normal muscle tone. Coordination normal.   Skin: Capillary refill takes less than 2 seconds. She is not diaphoretic.   Psychiatric: She has a normal mood and affect. Her behavior is normal. Judgment and thought content normal.       ED Course        Procedures               Critical Care time:  none             ED Medications:  Medications   OLANZapine zydis (zyPREXA) ODT tab 5 mg (5 mg Oral Given 1/26/19 1257)       ED Vitals:  Vitals:    01/26/19 1214 01/26/19 1216   BP: 98/48    Pulse: 64    Temp:  97.9  F (36.6  C)   TempSrc:  Oral   SpO2: 100%    Weight: 52.2 kg (115 lb)        ED Labs and Imaging:  Results for orders placed or performed during the hospital encounter of 01/26/19 (from the past 24 hour(s))   CBC with platelets differential   Result Value Ref Range    WBC 8.9 4.0 - 11.0 10e9/L    RBC Count 4.53 3.8 - 5.2 10e12/L    Hemoglobin 14.5 11.7 - 15.7 g/dL    Hematocrit 42.6 35.0 - 47.0 %    MCV 94 78 - 100 fl    MCH 32.0 26.5 - 33.0 pg    MCHC 34.0 31.5 - 36.5 g/dL    RDW 11.9 10.0 - 15.0 %    Platelet Count 159 150 - 450 10e9/L    Diff Method  Automated Method     % Neutrophils 91.7 %    % Lymphocytes 4.4 %    % Monocytes 2.9 %    % Eosinophils 0.0 %    % Basophils 0.4 %    % Immature Granulocytes 0.6 %    Nucleated RBCs 0 0 /100    Absolute Neutrophil 8.2 1.6 - 8.3 10e9/L    Absolute Lymphocytes 0.4 (L) 0.8 - 5.3 10e9/L    Absolute Monocytes 0.3 0.0 - 1.3 10e9/L    Absolute Eosinophils 0.0 0.0 - 0.7 10e9/L    Absolute Basophils 0.0 0.0 - 0.2 10e9/L    Abs Immature Granulocytes 0.1 0 - 0.4 10e9/L    Absolute Nucleated RBC 0.0    Basic metabolic panel   Result Value Ref Range    Sodium 135 133 - 144 mmol/L    Potassium 3.7 3.4 - 5.3 mmol/L    Chloride 103 94 - 109 mmol/L    Carbon Dioxide 23 20 - 32 mmol/L    Anion Gap 9 3 - 14 mmol/L    Glucose 95 70 - 99 mg/dL    Urea Nitrogen 11 7 - 30 mg/dL    Creatinine 0.73 0.52 - 1.04 mg/dL    GFR Estimate >90 >60 mL/min/[1.73_m2]    GFR Estimate If Black >90 >60 mL/min/[1.73_m2]    Calcium 8.6 8.5 - 10.1 mg/dL   Abdomen, flat/upright (2 views)    Narrative    XR ABDOMEN 2 VW 1/26/2019 2:14 PM     HISTORY: Surgeries, right lower quadrant abdominal pain that began  early this morning.  Evaluate for obstruction versus other acute  intra-abdominal catastrophe or process    COMPARISON: None      Impression    IMPRESSION: The bowel gas pattern is nonobstructive. There is no  evidence of free intraperitoneal air. Surgical clips in the right  upper quadrant and in the pelvis. A right hip prosthesis is partially  imaged. Postoperative changes of left above-the-knee amputation.    REFUGIO BERG MD   UA reflex to Microscopic   Result Value Ref Range    Color Urine Straw     Appearance Urine Clear     Glucose Urine Negative NEG^Negative mg/dL    Bilirubin Urine Negative NEG^Negative    Ketones Urine 20 (A) NEG^Negative mg/dL    Specific Gravity Urine 1.004 1.003 - 1.035    Blood Urine Large (A) NEG^Negative    pH Urine 7.0 5.0 - 7.0 pH    Protein Albumin Urine Negative NEG^Negative mg/dL    Urobilinogen mg/dL 0.0 0.0 - 2.0  mg/dL    Nitrite Urine Negative NEG^Negative    Leukocyte Esterase Urine Negative NEG^Negative    Source Midstream Urine     RBC Urine 8 (H) 0 - 2 /HPF    WBC Urine 2 0 - 5 /HPF    Bacteria Urine Few (A) NEG^Negative /HPF    Mucous Urine Present (A) NEG^Negative /LPF   Abd/pelvis CT - no contrast - Stone Protocol    Narrative    CT ABDOMEN AND PELVIS WITHOUT CONTRAST 1/26/2019 5:00 PM     HISTORY: Abdominal pain, unspecified. Right-sided abdominal pain and  discomfort, microscopic hematuria. History of hysterectomy,  cholecystectomy. Evaluate for urinary stone vs other acute  intra-abdominal catastrophe.     TECHNIQUE: Axial images are obtained from the lung bases to the  symphysis without oral or IV contrast. Coronal reformatted images are  also generated.  Radiation dose for this scan was reduced using  automated exposure control, adjustment of the mA and/or kV according  to patient size, or iterative reconstruction technique.    FINDINGS:     The lung bases are clear. Trace amount of pericardial fluid is noted.    Abdomen: There is right hydronephrosis but no renal calculi.  Hydroureter extends down to an obstructing 0.3 cm stone at the right  UVJ. No other right urinary tract calculi. Two nonobstructing stones  are present in the left renal collecting system measuring up to 0.3 cm  in maximal diameter. No left hydronephrosis or hydroureter. No  intraluminal bladder calculi are appreciated.    The upper abdominal organs are otherwise within normal limits allowing  for the noncontrast technique including the liver, spleen, pancreas,  adrenal glands and kidneys. No enlarged lymph nodes. The bowel is  normal in caliber without obstruction or diverticulitis. The appendix  is normal.    Pelvis: The bladder is partially distended but otherwise unremarkable.  The rectum is normal. No enlarged pelvic lymph nodes or free fluid  allowing for the artifact caused by right hip replacement hardware.  Bone window examination is  unremarkable. No aggressive appearing bone  lesions. No left femur is visualized.      Impression    IMPRESSION:  1. Obstructing right UVJ stone causing right hydronephrosis and  hydroureter. There are two nonobstructing stones left renal collecting  system without hydronephrosis.  2. No bowel obstruction, diverticulitis or appendicitis. No other  acute changes are evident in the abdomen or pelvis to account for  patient's symptoms.  3. Trace pericardial effusion. Lung bases are clear.  4. Prior cholecystectomy and left leg amputation at the hip.    SANJEEV HIDALGO MD         12:34 PM Patient assessed.     Assessments & Plan (with Medical Decision Making)   Clinical Impression: 48-year-old female who presented by EMS for evaluation for abdominal pain.  She reports a history of phantom pain on gabapentin. Her pain and discomfort is likely due to her 3 mm right UVJ stone   She reports her pain began earlier this morning.  She reports she has not taken anything for her pain.  Patient reports a history of cholecystectomy hysterectomy in 2012.  She is not reporting any fever or chills and no vomiting exam she was thrashing around in pain but appeared to be distractible on questioning.  She was afebrile.  Blood pressure was 98/48 on arrival.  100% on room air.  Exam was limited due to patient's pain and discomfort and thrashing around in the bed      ED Course and Plan:   Differential diagnosis for her sudden onset of right lower quadrant and right-sided abdominal pain is broad with prior history of abdominal surgeries x-ray imaging was obtained to exclude obstruction versus other acute intra-abdominal catastrophe.  She has multiple medication allergies and intolerances including intolerance to morphine, Percocet, Tylenol with codeine, Vicodin and Dilaudid.  She was offered oral Zyprexa which provided marked relief of her pain and discomfort.  She has normal labs today including normal white count.  Normal lecture lites are  normal.  X-ray imaging shows no acute intra-abdominal catastrophe.  She was reexamined after XR imaging and blood work.  Patient was resting comfortably.  Her exam did not raise concern for intra-abdominal catastrophe.  Low suspicion for ovarian torsion or ruptured ovarian cyst.  Low suspicion for  emergency (with history of hysterectomy and oophorectomy).  Midstream urinalysis did show large amount of microscopic hematuria without pyuria.  There was 800 cells per high-power field.  We discussed the possibly that she could have very kidney stone causing  pain.      We discussed imaging versus trial of going home to see if her pain recurs she may need to return for reevaluation and care which is complaining CT and ultrasound the emergency department today.  After reviewing risk and benefits patient elected to  proceed with imaging.     CT imaging showed-a 3 mm right UVJ stone with right hydroureter c  There was also incidental finding of nonobstructing stone in the left renal system without hydronephrosis.  Patient was reassured by CT findings she is discharged home with supportive care and anticipating spontaneous passage of her right UVJ stone. Patient is discharged to home with supportive care including ibuprofen use for pain control.  We reviewed reasons to return to the ED for care she expressed understanding        Disclaimer: This note consists of symbols derived from keyboarding, dictation and/or voice recognition software. As a result, there may be errors in the script that have gone undetected. Please consider this when interpreting information found in this chart.      I have reviewed the nursing notes.    I have reviewed the findings, diagnosis, plan and need for follow up with the patient.          Medication List      There are no discharge medications for this visit.         Final diagnoses:   Abdominal pain, right lower quadrant - Likely due to a 3 mm right UVJ stone   Right ureteral calculus - 3 mm  right UVJ stone       This document serves as a record of the services and decisions personally performed and made by Clarence Olivier. The HPI was created on his behalf by Janie Chicas, a trained medical scribe. The creation of this document is based the provider's statements to the medical scribe.  Janie Chicas 12:27 PM 1/26/2019    Provider:   The information in this document, created by the medical scribe for me, accurately reflects the services I personally performed and the decisions made by me. I have reviewed and approved this document for accuracy prior to leaving the patient care area.  Clarence Olivier, 12:27 PM 1/26/2019 1/26/2019   [facility] EMERGENCY DEPARTMENT     Clarence Olivier MD  01/26/19 4562

## 2019-01-26 NOTE — DISCHARGE INSTRUCTIONS
1)  your pain and discomfort likely due to a small kidney stone in the right ureterovesicular junction..  2) blood in urine on microscopic exam. Imaging today showed a 3 mm right UVJ stone with 2 small stones that are causing any obstruction on the left side of the kidney as well    3)  Follow-up with your doctor for recheck on your symptoms in the next 3 days.  Return to the emergency department for reevaluation if symptoms worsen or you develop new concerns.    4)it is okay to use ibuprofen for pain and discomfort.  I anticipate your pain will improve once you have passed the kidney stone in the next 1-2 weeks.

## 2019-05-07 ENCOUNTER — HOSPITAL ENCOUNTER (OUTPATIENT)
Dept: LAB | Facility: CLINIC | Age: 49
Discharge: HOME | End: 2019-05-07
Attending: INTERNAL MEDICINE | Admitting: INTERNAL MEDICINE
Payer: MEDICARE

## 2019-05-07 DIAGNOSIS — Z85.3 PERSONAL HISTORY OF MALIGNANT NEOPLASM OF BREAST: ICD-10-CM

## 2019-05-07 LAB
ALBUMIN SERPL-MCNC: 3.8 G/DL (ref 3.4–5)
ALP SERPL-CCNC: 92 U/L (ref 40–150)
ALT SERPL W P-5'-P-CCNC: 21 U/L (ref 0–50)
ANION GAP SERPL CALCULATED.3IONS-SCNC: 4 MMOL/L (ref 3–14)
AST SERPL W P-5'-P-CCNC: 18 U/L (ref 0–45)
BASOPHILS # BLD AUTO: 0 10E9/L (ref 0–0.2)
BASOPHILS NFR BLD AUTO: 0.5 %
BILIRUB SERPL-MCNC: 0.3 MG/DL (ref 0.2–1.3)
BUN SERPL-MCNC: 8 MG/DL (ref 7–30)
CALCIUM SERPL-MCNC: 9.1 MG/DL (ref 8.5–10.1)
CANCER AG27-29 SERPL-ACNC: 20 U/ML (ref 0–39)
CHLORIDE SERPL-SCNC: 107 MMOL/L (ref 94–109)
CO2 SERPL-SCNC: 31 MMOL/L (ref 20–32)
CREAT SERPL-MCNC: 0.54 MG/DL (ref 0.52–1.04)
DIFFERENTIAL METHOD BLD: ABNORMAL
EOSINOPHIL # BLD AUTO: 0.1 10E9/L (ref 0–0.7)
EOSINOPHIL NFR BLD AUTO: 1.3 %
ERYTHROCYTE [DISTWIDTH] IN BLOOD BY AUTOMATED COUNT: 12.6 % (ref 10–15)
GFR SERPL CREATININE-BSD FRML MDRD: >90 ML/MIN/{1.73_M2}
GLUCOSE SERPL-MCNC: 85 MG/DL (ref 70–99)
HCT VFR BLD AUTO: 43.4 % (ref 35–47)
HGB BLD-MCNC: 13.9 G/DL (ref 11.7–15.7)
IMM GRANULOCYTES # BLD: 0 10E9/L (ref 0–0.4)
IMM GRANULOCYTES NFR BLD: 0.3 %
LYMPHOCYTES # BLD AUTO: 0.7 10E9/L (ref 0.8–5.3)
LYMPHOCYTES NFR BLD AUTO: 19.3 %
MCH RBC QN AUTO: 31.1 PG (ref 26.5–33)
MCHC RBC AUTO-ENTMCNC: 32 G/DL (ref 31.5–36.5)
MCV RBC AUTO: 97 FL (ref 78–100)
MONOCYTES # BLD AUTO: 0.4 10E9/L (ref 0–1.3)
MONOCYTES NFR BLD AUTO: 9.1 %
NEUTROPHILS # BLD AUTO: 2.7 10E9/L (ref 1.6–8.3)
NEUTROPHILS NFR BLD AUTO: 69.5 %
NRBC # BLD AUTO: 0 10*3/UL
NRBC BLD AUTO-RTO: 0 /100
PLATELET # BLD AUTO: 222 10E9/L (ref 150–450)
POTASSIUM SERPL-SCNC: 3.5 MMOL/L (ref 3.4–5.3)
PROT SERPL-MCNC: 7.9 G/DL (ref 6.8–8.8)
RBC # BLD AUTO: 4.47 10E12/L (ref 3.8–5.2)
SODIUM SERPL-SCNC: 142 MMOL/L (ref 133–144)
WBC # BLD AUTO: 3.8 10E9/L (ref 4–11)

## 2019-05-07 PROCEDURE — 85025 COMPLETE CBC W/AUTO DIFF WBC: CPT | Performed by: INTERNAL MEDICINE

## 2019-05-07 PROCEDURE — 86300 IMMUNOASSAY TUMOR CA 15-3: CPT | Performed by: INTERNAL MEDICINE

## 2019-05-07 PROCEDURE — 80053 COMPREHEN METABOLIC PANEL: CPT | Performed by: INTERNAL MEDICINE

## 2019-05-07 PROCEDURE — 36415 COLL VENOUS BLD VENIPUNCTURE: CPT | Performed by: INTERNAL MEDICINE

## 2019-05-20 ENCOUNTER — ONCOLOGY VISIT (OUTPATIENT)
Dept: ONCOLOGY | Facility: CLINIC | Age: 49
End: 2019-05-20
Attending: INTERNAL MEDICINE
Payer: MEDICARE

## 2019-05-20 VITALS
TEMPERATURE: 99 F | HEART RATE: 90 BPM | OXYGEN SATURATION: 98 % | DIASTOLIC BLOOD PRESSURE: 65 MMHG | SYSTOLIC BLOOD PRESSURE: 106 MMHG | BODY MASS INDEX: 22.5 KG/M2 | WEIGHT: 114.6 LBS | RESPIRATION RATE: 16 BRPM | HEIGHT: 60 IN

## 2019-05-20 DIAGNOSIS — G62.9 NEUROPATHY: ICD-10-CM

## 2019-05-20 DIAGNOSIS — Z12.31 BREAST CANCER SCREENING BY MAMMOGRAM: ICD-10-CM

## 2019-05-20 DIAGNOSIS — Z85.3 PERSONAL HISTORY OF MALIGNANT NEOPLASM OF BREAST: Primary | ICD-10-CM

## 2019-05-20 DIAGNOSIS — D72.810 LYMPHOPENIA: ICD-10-CM

## 2019-05-20 PROCEDURE — 99214 OFFICE O/P EST MOD 30 MIN: CPT | Performed by: INTERNAL MEDICINE

## 2019-05-20 PROCEDURE — G0463 HOSPITAL OUTPT CLINIC VISIT: HCPCS

## 2019-05-20 ASSESSMENT — MIFFLIN-ST. JEOR: SCORE: 1071.32

## 2019-05-20 ASSESSMENT — PAIN SCALES - GENERAL: PAINLEVEL: NO PAIN (0)

## 2019-05-20 NOTE — PROGRESS NOTES
"ONCOLOGY FOLLOW UP VISIT     BREAST SURGEON:  Dr. Bell Ulrich, Breast Surgeon at [facility].      REASON FOR CONSULTATION AND REASON FOR VISIT:  left breast cancer 2015, ER/AK-, Her 2 low +, s/p neoadjuvant chemo      HISTORY OF PRESENT ILLNESS: She presented with a palpable mass in the left breast for 3 weeks at age 45.  Diagnostic mammogram in 06/2015 identified a 3 cm mass in the upper outer quadrant of the left breast.  This is around at 1 o'clock position, 9 cm from the nipple.  Biopsy indicating ER/AK negative, HER-2 low positive, FISH ratio 2.2, copy number was 3.7.      Staging PET found hypermetabolic adenopathy in the mediastinum, bilateral hilar regions, left lateral posterior nasopharynx and oropharynx, small left axilla LN. EBUS by [facility] 8/2015 found non-necrotizing granulomas and negative for malignancy.     She was seen by Dr. Rodriguez and recommended neoadjuvant AC with wkly taxol , C1 AC 8/17/2015. Dr. Rodriguez requested further Her 2 testing at [facility], who read is as \"+\".      Wkly T taxol 80 mg/m2, Pertuzumab and herceptin q 3 wks are offered after DD AC. She had severe diarrhea and hypkalemia, Pertuzumab was dropped after 2 cycles. She finished total 12 wks of wkly taxol and herceptin.     She had Left lumpectomy 3/1/2016 at [facility] to have 0.7 cm residual high grade IDC, TN, clear margin, 3 LNs are negative. Adjuvant herceptin is offered for 1 yr till 3/2016. S/p RT till 5/2016.       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  She subsequently had knee problems on the left side, eventually required amputation around 2011 through different steps of surgery.  Reflux disease, chronic pain syndrome, moderate major depression.      MEDICATIONS:  Reviewed in Epic system.      SOCIAL HISTORY:  She lives in [place].  She moved from [place] to [place] than from [place] to [place] close to her family.  She has her personal care agent.  She is " not .  She has never been pregnant and she has no kids.  She lives by herself in an apartment.   She is very involved with community work.      FAMILY HISTORY:  No family history of breast or ovarian cancer or other cancer on the mother's side.  Father's side history is unreliable.      REVIEW OF SYSTEMS:   Above knee amputation of the left leg.  Chronic pain syndrome. Right hip replacement in 1/2018, has persistent pain after.        PHYSICAL EXAMINATION:   GENERAL APPEARANCE:  A middle-aged woman, looks like her stated age.  She is very on top of her medical history.     ECOG 0    VITAL SIGNS:  Blood pressure 106/65, pulse 90, temperature 99  F (37.2  C), temperature source Tympanic, resp. rate 16, height 1.524 m (5'), weight 52 kg (114 lb 9.6 oz), last menstrual period 08/07/2012, SpO2 98 %, not currently breastfeeding.  HEENT: The patient is normocephalic, atraumatic. Pupils are equally reactive to light.  Sclerae are anicteric.  Moist oral mucosa.  Negative pharynx.  No oral thrush.   NECK:  Supple.  No jugular venous distention.  Thyroid is not palpable.   LYMPH NODES:  Superficial lymphadenopathy is not appreciable in the bilateral cervical, supraclavicular, axillary or inguinal areas.   CARDIOVASCULAR:  S1, S2 regular with no murmurs or gallops.  No carotid or abdominal bruits.   PULMONARY:  Lungs are clear to auscultation and percussion bilaterally.  There is no wheezing or rhonchi.   GASTROINTESTINAL:  Abdomen is soft, nontender.  No hepatosplenomegaly.  No signs of ascites.  No mass appreciable.   MUSCULOSKELETAL/EXTREMITIES:  Left above the knee amputation for osteosarcoma  NEUROLOGIC:  Cranial nerves II-XII are grossly intact.  Sensation intact.  Muscle strength and muscle tone symmetrical, 5/5 throughout.   BACK:  No spinal or paraspinal tenderness.  No CVA tenderness.   SKIN:  No petechiae.  No rash.  No signs of cellulitis. Diffuse dry skin.  BREASTS: well healed lumpectomy scar wihtout edema  or ecchymoses.        CURRENT LABORATORY DATA REVIEWED  Wbc 3.8, ALC 0.7  CMP/marker are good.       Current Image Reviewed  MA 8/2018 MA: negative    CT 2/2018   1. Previously described mediastinal and hilar nodes are either stable  or smaller, reference nodes as above.  2. No new pulmonary abnormality.      OLD DATA REVIEW WITH SUMMARY  MA at [facility] 8/2017 - negative    CT body 3/2017: There is worsening or mediastinal and hilar adenopathy.  There also is a new mildly prominent gastrohepatic ligament lymph node. She was referred to pulmonary at , tumor conference discussion no biopsy is recommended.     MA 9/2016 from [place]: negative.     8/2015 EBUS at [facility] biopsy of station 4R, 11L, 7 - none malignancy, non-necrotizing granulomas.    BCRA 1/2 mutation from [facility] 10/2015: negative.     7/2015 [id number] baseline 52  Cbc diff/CMP are good.       PET 7/2015  1. Focal asymmetric hypermetabolism at the left lateral posterior nasopharynx and oropharynx, series 102 image 36 (SUV Max 9.4). There is no conspicuous mass lesion seen at this location.  2. Hypermetabolic adenopathy in the mediastinum, bilateral hilar regions, and suspected at the left axilla.   1)Small left axillary lymph node shows mild hypermetabolism and measures 1.6 x 1.0 cm series 3 image 104 (SUV Max 2.8). A few adjacent left axillary small lymph nodes has minimal elevated metabolism.  2) Left hilar adenopathy measures 1.3 x 0.8 cm, series 3 image 109 (SUV Max 9.3). Example of a right hilar adenopathy measures 1.7 x 1.1 cm, series 3 image 112 (SUV Max 11.3).   3) Example of mediastinal adenopathy at the subcarinal location measures 2.0 x 0.9 cm, series 3 image 115 (SUV Max 8.4). There are numerous additional examples.  3. Left lateral breast mass is 3.2 x 3.0 cm, series 3 image 103 (SUV Max 16.4). Trace pericardial fluid.  4. Hazy ground-glass opacities and some interstitial prominence could represent pulmonary edema or other alveolar infiltrate.  There is a small pericardial effusion.         ASSESSMENT AND PLAN:   1. She was diagnosed clinically T2 N1 MX  ER/[place] -, Her 2 low + left breast upper outer quadrant of the left breast high-grade invasive ductal cancer 6/2015 s/p neoadjuvant S/p DD AC -- wkly taxol and herceptin partial pertuzumab (dropped due to severe diarrhea).   She had Left lumpectomy 3/1/2016 at [facility] to have 0.7 cm residual high grade IDC, TN, clear margin, 3 LNs are negative. Adjuvant herceptin is offered for 1 yr till 3/2016. S/p RT till 5/2016.     She is on  follow up 6  months for now.     Early diagnosis breast cancer younger than 50 years old.  She saw genetic at [facility] 8/25/2015, BRCA 1/2 tests was negative.    2. On going neuropathy on toes. She is on gabapentin. She reports this is better.     3. ROSSY mediastinum, bilateral hilar regions, left lateral posterior nasopharynx and oropharynx, small left axilla LN. EBUS by [facility] 8/2015 found non-necrotizing granulomas.   CT 3/2017 found worsening ROSSY in chest. Then improved in 2/2018.   Adviced pulmonary evaluation. She is not compliant on this.     4. New leukopenia/lymphopeina 5/2019 - advice balanced diet. Will recheck.

## 2019-05-20 NOTE — LETTER
5/20/2019         RE: Paola Alicea  [address]  [place] [address]        Dear Colleague,    Thank you for referring your patient, Paola Alicea, to the [facility]. Please see a copy of my visit note below.    Oncology Rooming Note    May 20, 2019 1:33 PM   Paola Alicea is a 48 year old female who presents for:    Chief Complaint   Patient presents with     Oncology Clinic Visit     6 month recheck Personal history of malignant neoplasm of breast,      Initial Vitals: /65 (BP Location: Right arm, Patient Position: Sitting, Cuff Size: Adult Regular)   Pulse 90   Temp 99  F (37.2  C) (Tympanic)   Resp 16   Ht 1.524 m (5')   Wt 52 kg (114 lb 9.6 oz)   LMP 08/07/2012   SpO2 98%   BMI 22.38 kg/m    Estimated body mass index is 22.38 kg/m  as calculated from the following:    Height as of this encounter: 1.524 m (5').    Weight as of this encounter: 52 kg (114 lb 9.6 oz). Body surface area is 1.48 meters squared.  No Pain (0) Comment: Data Unavailable   Patient's last menstrual period was 08/07/2012.  Allergies reviewed: Yes  Medications reviewed: Yes    Medications: Medication refills not needed today.  Pharmacy name entered into [facility]: [id number] - [address]    Clinical concerns: 6 month recheck Personal history of malignant neoplasm of breast.     Brunilda Carter [facility]                  ONCOLOGY FOLLOW UP VISIT     BREAST SURGEON:  Dr. Bell Ulrich, Breast Surgeon at [facility].      REASON FOR CONSULTATION AND REASON FOR VISIT:  left breast cancer 2015, ER/AL-, Her 2 low +, s/p neoadjuvant chemo      HISTORY OF PRESENT ILLNESS: She presented with a palpable mass in the left breast for 3 weeks at age 45.  Diagnostic mammogram in 06/2015 identified a 3 cm mass in the upper outer quadrant of the left breast.  This is around at 1 o'clock position, 9 cm from the nipple.  Biopsy indicating ER/AL negative, HER-2 low positive, FISH ratio  "2.2, copy number was 3.7.      Staging PET found hypermetabolic adenopathy in the mediastinum, bilateral hilar regions, left lateral posterior nasopharynx and oropharynx, small left axilla LN. EBUS by [facility] 8/2015 found non-necrotizing granulomas and negative for malignancy.     She was seen by Dr. Rodriguez and recommended neoadjuvant AC with wkly taxol , C1 AC 8/17/2015. Dr. Rodriguez requested further Her 2 testing at [place], who read is as \"+\".      Wkly T taxol 80 mg/m2, Pertuzumab and herceptin q 3 wks are offered after DD AC. She had severe diarrhea and hypkalemia, Pertuzumab was dropped after 2 cycles. She finished total 12 wks of wkly taxol and herceptin.     She had Left lumpectomy 3/1/2016 at [facility] to have 0.7 cm residual high grade IDC, TN, clear margin, 3 LNs are negative. Adjuvant herceptin is offered for 1 yr till 3/2016. S/p RT till 5/2016.       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  She subsequently had knee problems on the left side, eventually required amputation around 2011 through different steps of surgery.  Reflux disease, chronic pain syndrome, moderate major depression.      MEDICATIONS:  Reviewed in Epic system.      SOCIAL HISTORY:  She lives in [place].  She moved from [place] to [place] than from [place] to [place] close to her family.  She has her personal care agent.  She is not .  She has never been pregnant and she has no kids.  She lives by herself in an apartment.   She is very involved with community work.      FAMILY HISTORY:  No family history of breast or ovarian cancer or other cancer on the mother's side.  Father's side history is unreliable.      REVIEW OF SYSTEMS:   Above knee amputation of the left leg.  Chronic pain syndrome. Right hip replacement in 1/2018, has persistent pain after.        PHYSICAL EXAMINATION:   GENERAL APPEARANCE:  A middle-aged woman, looks like her stated age.  She is very " on top of her medical history.     ECOG 0    VITAL SIGNS:  Blood pressure 106/65, pulse 90, temperature 99  F (37.2  C), temperature source Tympanic, resp. rate 16, height 1.524 m (5'), weight 52 kg (114 lb 9.6 oz), last menstrual period 08/07/2012, SpO2 98 %, not currently breastfeeding.  HEENT: The patient is normocephalic, atraumatic. Pupils are equally reactive to light.  Sclerae are anicteric.  Moist oral mucosa.  Negative pharynx.  No oral thrush.   NECK:  Supple.  No jugular venous distention.  Thyroid is not palpable.   LYMPH NODES:  Superficial lymphadenopathy is not appreciable in the bilateral cervical, supraclavicular, axillary or inguinal areas.   CARDIOVASCULAR:  S1, S2 regular with no murmurs or gallops.  No carotid or abdominal bruits.   PULMONARY:  Lungs are clear to auscultation and percussion bilaterally.  There is no wheezing or rhonchi.   GASTROINTESTINAL:  Abdomen is soft, nontender.  No hepatosplenomegaly.  No signs of ascites.  No mass appreciable.   MUSCULOSKELETAL/EXTREMITIES:  Left above the knee amputation for osteosarcoma  NEUROLOGIC:  Cranial nerves II-XII are grossly intact.  Sensation intact.  Muscle strength and muscle tone symmetrical, 5/5 throughout.   BACK:  No spinal or paraspinal tenderness.  No CVA tenderness.   SKIN:  No petechiae.  No rash.  No signs of cellulitis. Diffuse dry skin.  BREASTS: well healed lumpectomy scar wihtout edema or ecchymoses.        CURRENT LABORATORY DATA REVIEWED  Wbc 3.8, ALC 0.7  CMP/marker are good.       Current Image Reviewed  MA 8/2018 MA: negative    CT 2/2018   1. Previously described mediastinal and hilar nodes are either stable  or smaller, reference nodes as above.  2. No new pulmonary abnormality.      OLD DATA REVIEW WITH SUMMARY  MA at [facility] 8/2017 - negative    CT body 3/2017: There is worsening or mediastinal and hilar adenopathy.  There also is a new mildly prominent gastrohepatic ligament lymph node. [facility], tumor [facility] discussion no biopsy is recommended.     MA 9/2016 from [place]: negative.     8/2015 EBUS at [facility] biopsy of station 4R, 11L, 7 - none malignancy, non-necrotizing granulomas.    BCRA 1/2 mutation from [facility] 10/2015: negative.     7/2015 [id number] baseline 52  Cbc diff/CMP are good.       PET 7/2015  1. Focal asymmetric hypermetabolism at the left lateral posterior nasopharynx and oropharynx, series 102 image 36 (SUV Max 9.4). There is no conspicuous mass lesion seen at this location.  2. Hypermetabolic adenopathy in the mediastinum, bilateral hilar regions, and suspected at the left axilla.   1)Small left axillary lymph node shows mild hypermetabolism and measures 1.6 x 1.0 cm series 3 image 104 (SUV Max 2.8). A few adjacent left axillary small lymph nodes has minimal elevated metabolism.  2) Left hilar adenopathy measures 1.3 x 0.8 cm, series 3 image 109 (SUV Max 9.3). Example of a right hilar adenopathy measures 1.7 x 1.1 cm, series 3 image 112 (SUV Max 11.3).   3) Example of mediastinal adenopathy at the subcarinal location measures 2.0 x 0.9 cm, series 3 image 115 (SUV Max 8.4). There are numerous additional examples.  3. Left lateral breast mass is 3.2 x 3.0 cm, series 3 image 103 (SUV Max 16.4). Trace pericardial fluid.  4. Hazy ground-glass opacities and some interstitial prominence could represent pulmonary edema or other alveolar infiltrate. There is a small pericardial effusion.         ASSESSMENT AND PLAN:   1. She was diagnosed clinically T2 N1 MX  ER/[place] -, Her 2 low + left breast upper outer quadrant of the left breast high-grade invasive ductal cancer 6/2015 s/p neoadjuvant S/p DD AC -- wkly taxol and herceptin partial pertuzumab (dropped due to severe diarrhea).   She had Left lumpectomy 3/1/2016 at [facility] to have 0.7 cm residual high grade IDC, TN, clear margin, 3 LNs are negative. Adjuvant herceptin is offered for 1 yr till 3/2016. S/p RT till 5/2016.     She is on   follow up 6  months for now.     Early diagnosis breast cancer younger than 50 years old.  She saw genetic at [facility] 8/25/2015, BRCA 1/2 tests was negative.    2. On going neuropathy on toes. She is on gabapentin. She reports this is better.     3. ORSSY mediastinum, bilateral hilar regions, left lateral posterior nasopharynx and oropharynx, small left axilla LN. EBUS by [facility] 8/2015 found non-necrotizing granulomas.   CT 3/2017 found worsening ROSSY in chest. Then improved in 2/2018.   Adviced pulmonary evaluation. She is not compliant on this.     4. New leukopenia/lymphopeina 5/2019 - advice balanced diet. Will recheck.         Again, thank you for allowing me to participate in the care of your patient.        Sincerely,        Kelly Thomas MD, MD

## 2019-05-20 NOTE — PATIENT INSTRUCTIONS
Dr. Thomas would like you to recheck labs in August and we will call you with results. You are also due for a mammogram August.     We would like to see you back in 6 months for a follow up appointment with labs prior.     When you are in need of a refill, please call your pharmacy and they will send us a request.      Copy of appointments, and after visit summary (AVS) given to patient.      If you have any questions please call Rubi Lacy RN, BSN Oncology Hematology  [facility] [phone]. For questions after business hours, or on holidays/weekends, please call our after hours Nurse Triage line [phone]. Thank you.         Recheck labs in Aug. Will call pt on results.  Due MA in Aug.   6 months f/u with labs.

## 2019-05-20 NOTE — PROGRESS NOTES
Oncology Rooming Note    May 20, 2019 1:33 PM   Paola Alicea is a 48 year old female who presents for:    Chief Complaint   Patient presents with     Oncology Clinic Visit     6 month recheck Personal history of malignant neoplasm of breast,      Initial Vitals: /65 (BP Location: Right arm, Patient Position: Sitting, Cuff Size: Adult Regular)   Pulse 90   Temp 99  F (37.2  C) (Tympanic)   Resp 16   Ht 1.524 m (5')   Wt 52 kg (114 lb 9.6 oz)   LMP 08/07/2012   SpO2 98%   BMI 22.38 kg/m   Estimated body mass index is 22.38 kg/m  as calculated from the following:    Height as of this encounter: 1.524 m (5').    Weight as of this encounter: 52 kg (114 lb 9.6 oz). Body surface area is 1.48 meters squared.  No Pain (0) Comment: Data Unavailable   Patient's last menstrual period was 08/07/2012.  Allergies reviewed: Yes  Medications reviewed: Yes    Medications: Medication refills not needed today.  Pharmacy name entered into [facility]: [id number] - [address]    Clinical concerns: 6 month recheck Personal history of malignant neoplasm of breast.     Brunilda Carter, CMA

## 2019-05-21 ENCOUNTER — HOSPITAL ENCOUNTER (OUTPATIENT)
Dept: BONE DENSITY | Facility: CLINIC | Age: 49
Discharge: HOME | End: 2019-05-21
Attending: OBSTETRICS & GYNECOLOGY | Admitting: OBSTETRICS & GYNECOLOGY
Payer: MEDICARE

## 2019-05-21 DIAGNOSIS — Z87.310 PERSONAL HISTORY OF HEALED OSTEOPOROSIS FRACTURE: ICD-10-CM

## 2019-05-21 PROCEDURE — 77080 DXA BONE DENSITY AXIAL: CPT

## 2019-05-21 NOTE — LETTER
May 29, 2019      Paola Alicea  [address]   [place] [address]        Dear ,    We are writing to inform you of your test results. Your DEXA Scan results were Normal.    Resulted Orders   DX Hip/Pelvis/Spine    Narrative    DX HIP/PELVIS/SPINE 5/21/2019 11:11 AM    HISTORY: Prior healed osteoporosis fracture. Left leg amputation.  Right hip prosthesis.    TECHNIQUE: The lumbar spine and left radius are scanned with DXA  technique performed using a AddThis scanner.  DXA results are  reported according to T-score.  The T-score is the standard deviation  from the peak bone mass in a normal young adult patient.  A T-score of  -1.0 to -2.5 correlates with osteopenia.  A T-score of less than -2.5  correlates with osteoporosis.    In accordance with the ISCD (International Society of Clinical  Densitometry) the lowest BMD between the total hip and femoral neck is  reported.       Impression    IMPRESSION:  1. The T-score of the lumbar spine in the region of L1-L4 is -1.7.  This correlates with moderate osteopenia. If one looks at the L4  vertebral body alone the T-score is -1.9 which correlates with  moderate osteopenia.    2. The T-score of the left radius (33%) is -0.6 which correlates with  normal bone mineral density.    ISREAL GRAY MD       If you have any questions or concerns, please call the clinic at the number listed above.     Sincerely,   Stanislav Goel MD

## 2019-05-22 ENCOUNTER — NURSE TRIAGE (OUTPATIENT)
Dept: NURSING | Facility: CLINIC | Age: 49
End: 2019-05-22

## 2019-05-22 NOTE — TELEPHONE ENCOUNTER
Paola calls and says that she has a message from Any. RN checked Epic and did not find a message from a Any. RN told pt. This and also told pt. The message from Dr. Goel, from yesterday. Pt. Voiced understanding.    Reason for Disposition    [1] Follow-up call to recent contact AND [2] information only call, no triage required    Additional Information    Negative: [1] Caller is not with the adult (patient) AND [2] reporting urgent symptoms    Negative: Lab result questions    Negative: Medication questions    Negative: Caller can't be reached by phone    Negative: Caller has already spoken to PCP or another triager    Negative: RN needs further essential information from caller in order to complete triage    Negative: Requesting regular office appointment    Negative: [1] Caller requesting NON-URGENT health information AND [2] PCP's office is the best resource    Negative: Health Information question, no triage required and triager able to answer question    Negative: General information question, no triage required and triager able to answer question    Negative: Question about upcoming scheduled test, no triage required and triager able to answer question    Negative: [1] Caller is not with the adult (patient) AND [2] probable NON-URGENT symptoms    Protocols used: IN[facility] ONLY [facility]

## 2019-08-08 ENCOUNTER — HOSPITAL ENCOUNTER (OUTPATIENT)
Dept: MAMMOGRAPHY | Facility: CLINIC | Age: 49
Discharge: HOME | End: 2019-08-08
Attending: INTERNAL MEDICINE | Admitting: INTERNAL MEDICINE
Payer: MEDICARE

## 2019-08-08 ENCOUNTER — HOSPITAL ENCOUNTER (OUTPATIENT)
Dept: LAB | Facility: CLINIC | Age: 49
End: 2019-08-08
Attending: INTERNAL MEDICINE
Payer: MEDICARE

## 2019-08-08 DIAGNOSIS — D72.810 LYMPHOPENIA: ICD-10-CM

## 2019-08-08 DIAGNOSIS — Z12.31 BREAST CANCER SCREENING BY MAMMOGRAM: ICD-10-CM

## 2019-08-08 LAB
BASOPHILS # BLD AUTO: 0 10E9/L (ref 0–0.2)
BASOPHILS NFR BLD AUTO: 0.8 %
DIFFERENTIAL METHOD BLD: NORMAL
EOSINOPHIL # BLD AUTO: 0.1 10E9/L (ref 0–0.7)
EOSINOPHIL NFR BLD AUTO: 1.5 %
ERYTHROCYTE [DISTWIDTH] IN BLOOD BY AUTOMATED COUNT: 11.8 % (ref 10–15)
FOLATE SERPL-MCNC: 17.3 NG/ML
HCT VFR BLD AUTO: 42.5 % (ref 35–47)
HGB BLD-MCNC: 13.9 G/DL (ref 11.7–15.7)
IMM GRANULOCYTES # BLD: 0 10E9/L (ref 0–0.4)
IMM GRANULOCYTES NFR BLD: 0.3 %
LYMPHOCYTES # BLD AUTO: 0.8 10E9/L (ref 0.8–5.3)
LYMPHOCYTES NFR BLD AUTO: 20.1 %
MCH RBC QN AUTO: 31 PG (ref 26.5–33)
MCHC RBC AUTO-ENTMCNC: 32.7 G/DL (ref 31.5–36.5)
MCV RBC AUTO: 95 FL (ref 78–100)
MONOCYTES # BLD AUTO: 0.4 10E9/L (ref 0–1.3)
MONOCYTES NFR BLD AUTO: 11 %
NEUTROPHILS # BLD AUTO: 2.7 10E9/L (ref 1.6–8.3)
NEUTROPHILS NFR BLD AUTO: 66.3 %
NRBC # BLD AUTO: 0 10*3/UL
NRBC BLD AUTO-RTO: 0 /100
PLATELET # BLD AUTO: 248 10E9/L (ref 150–450)
RBC # BLD AUTO: 4.49 10E12/L (ref 3.8–5.2)
VIT B12 SERPL-MCNC: 294 PG/ML (ref 193–986)
WBC # BLD AUTO: 4 10E9/L (ref 4–11)

## 2019-08-08 PROCEDURE — 77067 SCR MAMMO BI INCL CAD: CPT

## 2019-08-08 PROCEDURE — 36415 COLL VENOUS BLD VENIPUNCTURE: CPT | Performed by: INTERNAL MEDICINE

## 2019-08-08 PROCEDURE — 85025 COMPLETE CBC W/AUTO DIFF WBC: CPT | Performed by: INTERNAL MEDICINE

## 2019-08-08 PROCEDURE — 82607 VITAMIN B-12: CPT | Performed by: INTERNAL MEDICINE

## 2019-08-08 PROCEDURE — 82746 ASSAY OF FOLIC ACID SERUM: CPT | Performed by: INTERNAL MEDICINE

## 2019-08-13 ENCOUNTER — PATIENT OUTREACH (OUTPATIENT)
Dept: ONCOLOGY | Facility: CLINIC | Age: 49
End: 2019-08-13

## 2019-08-13 NOTE — PROGRESS NOTES
Dr. Thomas reviewed pt lab results and they have returned to normal. Plan for pt to continue with planned f/u. Pt verbalized understanding.    Rubi Lacy RN on 8/13/2019 at 7:57 AM

## 2019-11-04 ENCOUNTER — HOSPITAL ENCOUNTER (OUTPATIENT)
Dept: LAB | Facility: CLINIC | Age: 49
Discharge: HOME | End: 2019-11-04
Attending: INTERNAL MEDICINE | Admitting: INTERNAL MEDICINE
Payer: MEDICARE

## 2019-11-04 DIAGNOSIS — Z85.3 PERSONAL HISTORY OF MALIGNANT NEOPLASM OF BREAST: ICD-10-CM

## 2019-11-04 LAB
ALBUMIN SERPL-MCNC: 3.6 G/DL (ref 3.4–5)
ALP SERPL-CCNC: 79 U/L (ref 40–150)
ALT SERPL W P-5'-P-CCNC: 17 U/L (ref 0–50)
ANION GAP SERPL CALCULATED.3IONS-SCNC: 3 MMOL/L (ref 3–14)
AST SERPL W P-5'-P-CCNC: 18 U/L (ref 0–45)
BASOPHILS # BLD AUTO: 0 10E9/L (ref 0–0.2)
BASOPHILS NFR BLD AUTO: 0.8 %
BILIRUB SERPL-MCNC: 0.3 MG/DL (ref 0.2–1.3)
BUN SERPL-MCNC: 8 MG/DL (ref 7–30)
CALCIUM SERPL-MCNC: 8.9 MG/DL (ref 8.5–10.1)
CANCER AG27-29 SERPL-ACNC: 18 U/ML (ref 0–39)
CHLORIDE SERPL-SCNC: 106 MMOL/L (ref 94–109)
CO2 SERPL-SCNC: 33 MMOL/L (ref 20–32)
CREAT SERPL-MCNC: 0.61 MG/DL (ref 0.52–1.04)
DIFFERENTIAL METHOD BLD: ABNORMAL
EOSINOPHIL # BLD AUTO: 0.1 10E9/L (ref 0–0.7)
EOSINOPHIL NFR BLD AUTO: 1.6 %
ERYTHROCYTE [DISTWIDTH] IN BLOOD BY AUTOMATED COUNT: 12.4 % (ref 10–15)
GFR SERPL CREATININE-BSD FRML MDRD: >90 ML/MIN/{1.73_M2}
GLUCOSE SERPL-MCNC: 83 MG/DL (ref 70–99)
HCT VFR BLD AUTO: 41.4 % (ref 35–47)
HGB BLD-MCNC: 13.5 G/DL (ref 11.7–15.7)
IMM GRANULOCYTES # BLD: 0 10E9/L (ref 0–0.4)
IMM GRANULOCYTES NFR BLD: 0.3 %
LYMPHOCYTES # BLD AUTO: 0.8 10E9/L (ref 0.8–5.3)
LYMPHOCYTES NFR BLD AUTO: 20.9 %
MCH RBC QN AUTO: 31.6 PG (ref 26.5–33)
MCHC RBC AUTO-ENTMCNC: 32.6 G/DL (ref 31.5–36.5)
MCV RBC AUTO: 97 FL (ref 78–100)
MONOCYTES # BLD AUTO: 0.4 10E9/L (ref 0–1.3)
MONOCYTES NFR BLD AUTO: 10.2 %
NEUTROPHILS # BLD AUTO: 2.5 10E9/L (ref 1.6–8.3)
NEUTROPHILS NFR BLD AUTO: 66.2 %
NRBC # BLD AUTO: 0 10*3/UL
NRBC BLD AUTO-RTO: 0 /100
PLATELET # BLD AUTO: 215 10E9/L (ref 150–450)
POTASSIUM SERPL-SCNC: 3.1 MMOL/L (ref 3.4–5.3)
PROT SERPL-MCNC: 7.6 G/DL (ref 6.8–8.8)
RBC # BLD AUTO: 4.27 10E12/L (ref 3.8–5.2)
SODIUM SERPL-SCNC: 142 MMOL/L (ref 133–144)
WBC # BLD AUTO: 3.7 10E9/L (ref 4–11)

## 2019-11-04 PROCEDURE — 80053 COMPREHEN METABOLIC PANEL: CPT | Performed by: INTERNAL MEDICINE

## 2019-11-04 PROCEDURE — 85025 COMPLETE CBC W/AUTO DIFF WBC: CPT | Performed by: INTERNAL MEDICINE

## 2019-11-04 PROCEDURE — 86300 IMMUNOASSAY TUMOR CA 15-3: CPT | Performed by: INTERNAL MEDICINE

## 2019-11-04 PROCEDURE — 36415 COLL VENOUS BLD VENIPUNCTURE: CPT | Performed by: INTERNAL MEDICINE

## 2019-11-07 ENCOUNTER — ONCOLOGY VISIT (OUTPATIENT)
Dept: ONCOLOGY | Facility: CLINIC | Age: 49
End: 2019-11-07
Attending: INTERNAL MEDICINE
Payer: MEDICARE

## 2019-11-07 VITALS
BODY MASS INDEX: 22.17 KG/M2 | DIASTOLIC BLOOD PRESSURE: 48 MMHG | OXYGEN SATURATION: 98 % | HEART RATE: 67 BPM | WEIGHT: 113.5 LBS | SYSTOLIC BLOOD PRESSURE: 112 MMHG | TEMPERATURE: 97.9 F | RESPIRATION RATE: 18 BRPM

## 2019-11-07 DIAGNOSIS — Z85.3 PERSONAL HISTORY OF MALIGNANT NEOPLASM OF BREAST: Primary | ICD-10-CM

## 2019-11-07 DIAGNOSIS — G62.9 NEUROPATHY: ICD-10-CM

## 2019-11-07 DIAGNOSIS — D72.819 LEUKOPENIA, UNSPECIFIED TYPE: ICD-10-CM

## 2019-11-07 PROCEDURE — 99214 OFFICE O/P EST MOD 30 MIN: CPT | Performed by: INTERNAL MEDICINE

## 2019-11-07 PROCEDURE — G0463 HOSPITAL OUTPT CLINIC VISIT: HCPCS

## 2019-11-07 ASSESSMENT — PAIN SCALES - GENERAL: PAINLEVEL: NO PAIN (0)

## 2019-11-07 NOTE — PATIENT INSTRUCTIONS
We would like to see you back in 6 months for a follow up appointment with labs prior.     When you are in need of a refill, please call your pharmacy and they will send us a request.      Copy of appointments, and after visit summary (AVS) given to patient.      If you have any questions please call Rubi Lacy RN, BSN Oncology Hematology  [facility] [phone]. For questions after business hours, or on holidays/weekends, please call our after hours Nurse Triage line [phone]. Thank you.         6 months f/u with labs.

## 2019-11-07 NOTE — NURSING NOTE
Oncology Rooming Note    November 7, 2019 1:21 PM   Paola Alicea is a 49 year old female who presents for:    Chief Complaint   Patient presents with     Oncology Clinic Visit     6 month recheck Personal history of malignant neoplasm of breast,      Initial Vitals: /48 (BP Location: Left arm, Patient Position: Sitting, Cuff Size: Adult Regular)   Pulse 67   Temp 97.9  F (36.6  C) (Tympanic)   Resp 18   Wt 51.5 kg (113 lb 8 oz)   LMP 08/07/2012   SpO2 98%   BMI 22.17 kg/m   Estimated body mass index is 22.17 kg/m  as calculated from the following:    Height as of 5/20/19: 1.524 m (5').    Weight as of this encounter: 51.5 kg (113 lb 8 oz). Body surface area is 1.48 meters squared.  No Pain (0) Comment: Data Unavailable   Patient's last menstrual period was 08/07/2012.  Allergies reviewed: Yes  Medications reviewed: Yes    Medications: Medication refills not needed today.  Pharmacy name entered into [facility]: [id number] - [address]    Clinical concerns: 6 month recheck Personal history of malignant neoplasm of breast,       Yoko Garcia LPN

## 2019-11-07 NOTE — PROGRESS NOTES
"ONCOLOGY FOLLOW UP VISIT     BREAST SURGEON:  Dr. Bell Ulrich, Breast Surgeon at [facility].      REASON FOR CONSULTATION AND REASON FOR VISIT:  left breast cancer 2015, ER/DC-, Her 2 low +, s/p neoadjuvant chemo      HISTORY OF ONCOLOGY ILLNESS: She presented with a palpable mass in the left breast for 3 weeks at age 45.  Diagnostic mammogram in 06/2015 identified a 3 cm mass in the upper outer quadrant of the left breast.  This is around at 1 o'clock position, 9 cm from the nipple.  Biopsy indicating ER/DC negative, HER-2 low positive, FISH ratio 2.2, copy number was 3.7.      Staging PET found hypermetabolic adenopathy in the mediastinum, bilateral hilar regions, left lateral posterior nasopharynx and oropharynx, small left axilla LN. EBUS by [facility] 8/2015 found non-necrotizing granulomas and negative for malignancy.     She was seen by Dr. Rodriguez and recommended neoadjuvant AC with wkly taxol , C1 AC 8/17/2015. Dr. Rodriguez requested further Her 2 testing at [place], who read is as \"+\".      Wkly T taxol 80 mg/m2, Pertuzumab and herceptin q 3 wks are offered after DD AC. She had severe diarrhea and hypkalemia, Pertuzumab was dropped after 2 cycles. She finished total 12 wks of wkly taxol and herceptin.     She had Left lumpectomy 3/1/2016 at [facility] to have 0.7 cm residual high grade IDC, TN, clear margin, 3 LNs are negative. Adjuvant herceptin is offered for 1 yr till 3/2016. S/p RT till 5/2016.       INTERVAL HISTORY:  Since the patient's last visit with us, there is no hospital stay/surgery/new diagnosis made.      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  She subsequently had knee problems on the left side, eventually required above knee amputation around 2011 through different steps of surgery.  Reflux disease, chronic pain syndrome, moderate major depression. Right hip replacement in 1/2018,     MEDICATIONS:  Reviewed in Epic system.      SOCIAL HISTORY:  She " lives in [place].  She moved from [place] to [place] than from [place] to [place] close to her family.  She has her personal care agent.  She is not .  She has never been pregnant and she has no kids.  She lives by herself in an apartment.   She is very involved with community work.      FAMILY HISTORY:  No family history of breast or ovarian cancer or other cancer on the mother's side.  Father's side history is unreliable.        REVIEW OF SYSTEMS:   She is in her usual state of health continue taking gabapentin for her chronic leg pain post amputation.       PHYSICAL EXAMINATION:   GENERAL APPEARANCE:  A middle-aged woman, looks like her stated age.  She is very on top of her medical history.     ECOG 0    VITAL SIGNS:  Blood pressure 112/48, pulse 67, temperature 97.9  F (36.6  C), temperature source Tympanic, resp. rate 18, weight 51.5 kg (113 lb 8 oz), last menstrual period 08/07/2012, SpO2 98 %, not currently breastfeeding.  HEENT: The patient is normocephalic, atraumatic. Pupils are equally reactive to light.  Sclerae are anicteric.  Moist oral mucosa.  Negative pharynx.  No oral thrush.   NECK:  Supple.  No jugular venous distention.  Thyroid is not palpable.   LYMPH NODES:  Superficial lymphadenopathy is not appreciable in the bilateral cervical, supraclavicular, axillary or inguinal areas.   CARDIOVASCULAR:  S1, S2 regular with no murmurs or gallops.  No carotid or abdominal bruits.   PULMONARY:  Lungs are clear to auscultation and percussion bilaterally.  There is no wheezing or rhonchi.   GASTROINTESTINAL:  Abdomen is soft, nontender.  No hepatosplenomegaly.  No signs of ascites.  No mass appreciable.   MUSCULOSKELETAL/EXTREMITIES:  Left above the knee amputation for osteosarcoma  NEUROLOGIC:  Cranial nerves II-XII are grossly intact.  Sensation intact.  Muscle strength and muscle tone symmetrical, 5/5 throughout.   BACK:  No spinal or paraspinal tenderness.  No CVA tenderness.    SKIN:  No petechiae.  No rash.  No signs of cellulitis. Diffuse dry skin.  BREASTS: well healed lumpectomy scar wihtout edema or ecchymoses.          CURRENT LABORATORY DATA REVIEWED  Wbc 3.8, hb/PL are fine  B12 290+    CMP/marker are good.       Current Image Reviewed  MA 8/2019 MA: negative      OLD DATA REVIEW WITH SUMMARY  CT 2/2018   1. Previously described mediastinal and hilar nodes are either stable  or smaller, reference nodes as above.  2. No new pulmonary abnormality.    MA at [facility] 8/2017 - negative    CT body 3/2017: There is worsening or mediastinal and hilar adenopathy.  There also is a new mildly prominent gastrohepatic ligament lymph node. She was referred to pulmonary at , tumor conference discussion no biopsy is recommended.     MA 9/2016 from [facility]: negative.     8/2015 EBUS at [facility] biopsy of station 4R, 11L, 7 - none malignancy, non-necrotizing granulomas.    BCRA 1/2 mutation from [facility] 10/2015: negative.     7/2015 [id number] baseline 52  Cbc diff/CMP are good.       PET 7/2015  1. Focal asymmetric hypermetabolism at the left lateral posterior nasopharynx and oropharynx, series 102 image 36 (SUV Max 9.4). There is no conspicuous mass lesion seen at this location.  2. Hypermetabolic adenopathy in the mediastinum, bilateral hilar regions, and suspected at the left axilla.   1)Small left axillary lymph node shows mild hypermetabolism and measures 1.6 x 1.0 cm series 3 image 104 (SUV Max 2.8). A few adjacent left axillary small lymph nodes has minimal elevated metabolism.  2) Left hilar adenopathy measures 1.3 x 0.8 cm, series 3 image 109 (SUV Max 9.3). Example of a right hilar adenopathy measures 1.7 x 1.1 cm, series 3 image 112 (SUV Max 11.3).   3) Example of mediastinal adenopathy at the subcarinal location measures 2.0 x 0.9 cm, series 3 image 115 (SUV Max 8.4). There are numerous additional examples.  3. Left lateral breast mass is 3.2 x 3.0 cm, series 3 image 103 (SUV Max 16.4).  Trace pericardial fluid.  4. Hazy ground-glass opacities and some interstitial prominence could represent pulmonary edema or other alveolar infiltrate. There is a small pericardial effusion.         ASSESSMENT AND PLAN:   1. She was diagnosed clinically T2 N1 MX  ER/SD -, Her 2 low + left breast upper outer quadrant of the left breast high-grade invasive ductal cancer 6/2015 s/p neoadjuvant S/p DD AC -- wkly taxol and herceptin partial pertuzumab (dropped due to severe diarrhea).   She had Left lumpectomy 3/1/2016 at [facility] to have 0.7 cm residual high grade IDC, TN, clear margin, 3 LNs are negative. Adjuvant herceptin is offered for 1 yr till 3/2016. S/p RT till 5/2016.     She is on  follow up 6 months for now.     EShe saw genetic at [facility] 8/25/2015, BRCA 1/2 tests was negative.    2. On going neuropathy on toes.   She is on gabapentin. She reports this is better.        3. ROSSY mediastinum, bilateral hilar regions, left lateral posterior nasopharynx and oropharynx, small left axilla LN. EBUS by [facility] 8/2015 found non-necrotizing granulomas.   CT 3/2017 found worsening ROSSY in chest. Then improved in 2/2018.   Adviced pulmonary evaluation. She is not compliant on this.     4. New leukopenia/lymphopeina 5/2019, then on and off.   She has low normal B12, advised her to continue B12 supplementation.  Advised flu shot in the wintertime.

## 2019-11-07 NOTE — LETTER
"    11/7/2019         RE: Paola Alicea  [address]  [place] [facility] [place] [facility] [id number]        Dear Colleague,    Thank you for referring your patient, Paola Alicea, to the [facility]. Please see a copy of my visit note below.    ONCOLOGY FOLLOW UP VISIT     BREAST SURGEON:  Dr. Bell Ulrich, Breast Surgeon at [facility].      REASON FOR CONSULTATION AND REASON FOR VISIT:  left breast cancer 2015, ER/AK-, Her 2 low +, s/p neoadjuvant chemo      HISTORY OF ONCOLOGY ILLNESS: She presented with a palpable mass in the left breast for 3 weeks at age 45.  Diagnostic mammogram in 06/2015 identified a 3 cm mass in the upper outer quadrant of the left breast.  This is around at 1 o'clock position, 9 cm from the nipple.  Biopsy indicating ER/AK negative, HER-2 low positive, FISH ratio 2.2, copy number was 3.7.      Staging PET found hypermetabolic adenopathy in the mediastinum, bilateral hilar regions, left lateral posterior nasopharynx and oropharynx, small left axilla LN. EBUS by [facility] 8/2015 found non-necrotizing granulomas and negative for malignancy.     She was seen by Dr. Rodriguez and recommended neoadjuvant AC with wkly taxol , C1 AC 8/17/2015. Dr. Rodriguez requested further Her 2 testing at [facility], who read is as \"+\".      Wkly T taxol 80 mg/m2, Pertuzumab and herceptin q 3 wks are offered after DD AC. She had severe diarrhea and hypkalemia, Pertuzumab was dropped after 2 cycles. She finished total 12 wks of wkly taxol and herceptin.     She had Left lumpectomy 3/1/2016 at [facility] to have 0.7 cm residual high grade IDC, TN, clear margin, 3 LNs are negative. Adjuvant herceptin is offered for 1 yr till 3/2016. S/p RT till 5/2016.       INTERVAL HISTORY:  Since the patient's last visit with us, there is no hospital stay/surgery/new diagnosis made.      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  She subsequently had knee problems on the left side, eventually " required above knee amputation around 2011 through different steps of surgery.  Reflux disease, chronic pain syndrome, moderate major depression. Right hip replacement in 1/2018,     MEDICATIONS:  Reviewed in Epic system.      SOCIAL HISTORY:  She lives in [place].  She moved from [place] to [place] than from [place] to [place] close to her family.  She has her personal care agent.  She is not .  She has never been pregnant and she has no kids.  She lives by herself in an apartment.   She is very involved with community work.      FAMILY HISTORY:  No family history of breast or ovarian cancer or other cancer on the mother's side.  Father's side history is unreliable.        REVIEW OF SYSTEMS:   She is in her usual state of health continue taking gabapentin for her chronic leg pain post amputation.       PHYSICAL EXAMINATION:   GENERAL APPEARANCE:  A middle-aged woman, looks like her stated age.  She is very on top of her medical history.     ECOG 0    VITAL SIGNS:  Blood pressure 112/48, pulse 67, temperature 97.9  F (36.6  C), temperature source Tympanic, resp. rate 18, weight 51.5 kg (113 lb 8 oz), last menstrual period 08/07/2012, SpO2 98 %, not currently breastfeeding.  HEENT: The patient is normocephalic, atraumatic. Pupils are equally reactive to light.  Sclerae are anicteric.  Moist oral mucosa.  Negative pharynx.  No oral thrush.   NECK:  Supple.  No jugular venous distention.  Thyroid is not palpable.   LYMPH NODES:  Superficial lymphadenopathy is not appreciable in the bilateral cervical, supraclavicular, axillary or inguinal areas.   CARDIOVASCULAR:  S1, S2 regular with no murmurs or gallops.  No carotid or abdominal bruits.   PULMONARY:  Lungs are clear to auscultation and percussion bilaterally.  There is no wheezing or rhonchi.   GASTROINTESTINAL:  Abdomen is soft, nontender.  No hepatosplenomegaly.  No signs of ascites.  No mass appreciable.   MUSCULOSKELETAL/EXTREMITIES:   Left above the knee amputation for osteosarcoma  NEUROLOGIC:  Cranial nerves II-XII are grossly intact.  Sensation intact.  Muscle strength and muscle tone symmetrical, 5/5 throughout.   BACK:  No spinal or paraspinal tenderness.  No CVA tenderness.   SKIN:  No petechiae.  No rash.  No signs of cellulitis. Diffuse dry skin.  BREASTS: well healed lumpectomy scar wihtout edema or ecchymoses.          CURRENT LABORATORY DATA REVIEWED  Wbc 3.8, hb/PL are fine  B12 290+    CMP/marker are good.       Current Image Reviewed  MA 8/2019 MA: negative      OLD DATA REVIEW WITH SUMMARY  CT 2/2018   1. Previously described mediastinal and hilar nodes are either stable  or smaller, reference nodes as above.  2. No new pulmonary abnormality.    MA at [facility] 8/2017 - negative    CT body 3/2017: There is worsening or mediastinal and hilar adenopathy.  There also is a new mildly prominent gastrohepatic ligament lymph node. She was referred to pulmonary at , tumor conference discussion no biopsy is recommended.     MA 9/2016 from [place]: negative.     8/2015 EBUS at [facility] biopsy of station 4R, 11L, 7 - none malignancy, non-necrotizing granulomas.    BCRA 1/2 mutation from [facility] 10/2015: negative.     7/2015 [id number] baseline 52  Cbc diff/CMP are good.       PET 7/2015  1. Focal asymmetric hypermetabolism at the left lateral posterior nasopharynx and oropharynx, series 102 image 36 (SUV Max 9.4). There is no conspicuous mass lesion seen at this location.  2. Hypermetabolic adenopathy in the mediastinum, bilateral hilar regions, and suspected at the left axilla.   1)Small left axillary lymph node shows mild hypermetabolism and measures 1.6 x 1.0 cm series 3 image 104 (SUV Max 2.8). A few adjacent left axillary small lymph nodes has minimal elevated metabolism.  2) Left hilar adenopathy measures 1.3 x 0.8 cm, series 3 image 109 (SUV Max 9.3). Example of a right hilar adenopathy measures 1.7 x 1.1 cm, series 3 image 112 (SUV Max  11.3).   3) Example of mediastinal adenopathy at the subcarinal location measures 2.0 x 0.9 cm, series 3 image 115 (SUV Max 8.4). There are numerous additional examples.  3. Left lateral breast mass is 3.2 x 3.0 cm, series 3 image 103 (SUV Max 16.4). Trace pericardial fluid.  4. Hazy ground-glass opacities and some interstitial prominence could represent pulmonary edema or other alveolar infiltrate. There is a small pericardial effusion.         ASSESSMENT AND PLAN:   1. She was diagnosed clinically T2 N1 MX  ER/FL -, Her 2 low + left breast upper outer quadrant of the left breast high-grade invasive ductal cancer 6/2015 s/p neoadjuvant S/p DD AC -- wkly taxol and herceptin partial pertuzumab (dropped due to severe diarrhea).   She had Left lumpectomy 3/1/2016 at [facility] to have 0.7 cm residual high grade IDC, TN, clear margin, 3 LNs are negative. Adjuvant herceptin is offered for 1 yr till 3/2016. S/p RT till 5/2016.     She is on  follow up 6 months for now.     EShe saw genetic at [facility] 8/25/2015, BRCA 1/2 tests was negative.    2. On going neuropathy on toes.   She is on gabapentin. She reports this is better.        3. ROSSY mediastinum, bilateral hilar regions, left lateral posterior nasopharynx and oropharynx, small left axilla LN. EBUS by [facility] 8/2015 found non-necrotizing granulomas.   CT 3/2017 found worsening ROSSY in chest. Then improved in 2/2018.   Adviced pulmonary evaluation. She is not compliant on this.     4. New leukopenia/lymphopeina 5/2019, then on and off.   She has low normal B12, advised her to continue B12 supplementation.  Advised flu shot in the wintertime.        Again, thank you for allowing me to participate in the care of your patient.        Sincerely,        Kelly Thomas MD, MD

## 2020-05-04 ENCOUNTER — HOSPITAL ENCOUNTER (OUTPATIENT)
Dept: LAB | Facility: CLINIC | Age: 50
Discharge: HOME | End: 2020-05-04
Attending: INTERNAL MEDICINE | Admitting: INTERNAL MEDICINE
Payer: COMMERCIAL

## 2020-05-04 DIAGNOSIS — Z85.3 PERSONAL HISTORY OF MALIGNANT NEOPLASM OF BREAST: ICD-10-CM

## 2020-05-04 DIAGNOSIS — D72.819 LEUKOPENIA, UNSPECIFIED TYPE: ICD-10-CM

## 2020-05-04 LAB
ALBUMIN SERPL-MCNC: 3.5 G/DL (ref 3.4–5)
ALP SERPL-CCNC: 86 U/L (ref 40–150)
ALT SERPL W P-5'-P-CCNC: 62 U/L (ref 0–50)
ANION GAP SERPL CALCULATED.3IONS-SCNC: 6 MMOL/L (ref 3–14)
AST SERPL W P-5'-P-CCNC: 38 U/L (ref 0–45)
BASOPHILS # BLD AUTO: 0 10E9/L (ref 0–0.2)
BASOPHILS NFR BLD AUTO: 0.4 %
BILIRUB SERPL-MCNC: 0.2 MG/DL (ref 0.2–1.3)
BUN SERPL-MCNC: 9 MG/DL (ref 7–30)
CALCIUM SERPL-MCNC: 8.9 MG/DL (ref 8.5–10.1)
CANCER AG27-29 SERPL-ACNC: 16 U/ML (ref 0–39)
CHLORIDE SERPL-SCNC: 110 MMOL/L (ref 94–109)
CO2 SERPL-SCNC: 28 MMOL/L (ref 20–32)
CREAT SERPL-MCNC: 0.47 MG/DL (ref 0.52–1.04)
DIFFERENTIAL METHOD BLD: NORMAL
EOSINOPHIL # BLD AUTO: 0.1 10E9/L (ref 0–0.7)
EOSINOPHIL NFR BLD AUTO: 1.3 %
ERYTHROCYTE [DISTWIDTH] IN BLOOD BY AUTOMATED COUNT: 12.6 % (ref 10–15)
GFR SERPL CREATININE-BSD FRML MDRD: >90 ML/MIN/{1.73_M2}
GLUCOSE SERPL-MCNC: 88 MG/DL (ref 70–99)
HCT VFR BLD AUTO: 39.1 % (ref 35–47)
HGB BLD-MCNC: 12.8 G/DL (ref 11.7–15.7)
IMM GRANULOCYTES # BLD: 0 10E9/L (ref 0–0.4)
IMM GRANULOCYTES NFR BLD: 0.4 %
LYMPHOCYTES # BLD AUTO: 0.9 10E9/L (ref 0.8–5.3)
LYMPHOCYTES NFR BLD AUTO: 18.1 %
MCH RBC QN AUTO: 31.4 PG (ref 26.5–33)
MCHC RBC AUTO-ENTMCNC: 32.7 G/DL (ref 31.5–36.5)
MCV RBC AUTO: 96 FL (ref 78–100)
MONOCYTES # BLD AUTO: 0.4 10E9/L (ref 0–1.3)
MONOCYTES NFR BLD AUTO: 9.4 %
NEUTROPHILS # BLD AUTO: 3.3 10E9/L (ref 1.6–8.3)
NEUTROPHILS NFR BLD AUTO: 70.4 %
NRBC # BLD AUTO: 0 10*3/UL
NRBC BLD AUTO-RTO: 0 /100
PLATELET # BLD AUTO: 190 10E9/L (ref 150–450)
POTASSIUM SERPL-SCNC: 3.5 MMOL/L (ref 3.4–5.3)
PROT SERPL-MCNC: 7.7 G/DL (ref 6.8–8.8)
RBC # BLD AUTO: 4.08 10E12/L (ref 3.8–5.2)
SODIUM SERPL-SCNC: 144 MMOL/L (ref 133–144)
VIT B12 SERPL-MCNC: 291 PG/ML (ref 193–986)
WBC # BLD AUTO: 4.7 10E9/L (ref 4–11)

## 2020-05-04 PROCEDURE — 80053 COMPREHEN METABOLIC PANEL: CPT | Performed by: INTERNAL MEDICINE

## 2020-05-04 PROCEDURE — 36415 COLL VENOUS BLD VENIPUNCTURE: CPT | Performed by: INTERNAL MEDICINE

## 2020-05-04 PROCEDURE — 86300 IMMUNOASSAY TUMOR CA 15-3: CPT | Performed by: INTERNAL MEDICINE

## 2020-05-04 PROCEDURE — 85025 COMPLETE CBC W/AUTO DIFF WBC: CPT | Performed by: INTERNAL MEDICINE

## 2020-05-04 PROCEDURE — 82607 VITAMIN B-12: CPT | Performed by: INTERNAL MEDICINE

## 2020-05-11 ENCOUNTER — ONCOLOGY VISIT (OUTPATIENT)
Dept: ONCOLOGY | Facility: CLINIC | Age: 50
End: 2020-05-11
Attending: INTERNAL MEDICINE
Payer: COMMERCIAL

## 2020-05-11 VITALS
RESPIRATION RATE: 16 BRPM | TEMPERATURE: 98.8 F | SYSTOLIC BLOOD PRESSURE: 107 MMHG | WEIGHT: 110 LBS | HEIGHT: 59 IN | HEART RATE: 92 BPM | OXYGEN SATURATION: 97 % | BODY MASS INDEX: 22.18 KG/M2 | DIASTOLIC BLOOD PRESSURE: 67 MMHG

## 2020-05-11 DIAGNOSIS — Z85.3 PERSONAL HISTORY OF MALIGNANT NEOPLASM OF BREAST: Primary | ICD-10-CM

## 2020-05-11 DIAGNOSIS — Z12.31 SCREENING MAMMOGRAM, ENCOUNTER FOR: ICD-10-CM

## 2020-05-11 DIAGNOSIS — G62.9 NEUROPATHY: ICD-10-CM

## 2020-05-11 PROCEDURE — G0463 HOSPITAL OUTPT CLINIC VISIT: HCPCS

## 2020-05-11 PROCEDURE — 99214 OFFICE O/P EST MOD 30 MIN: CPT | Performed by: INTERNAL MEDICINE

## 2020-05-11 RX ORDER — ACETAMINOPHEN 500 MG
1000 TABLET ORAL
COMMUNITY
Start: 2020-04-28

## 2020-05-11 ASSESSMENT — PAIN SCALES - GENERAL: PAINLEVEL: NO PAIN (0)

## 2020-05-11 ASSESSMENT — MIFFLIN-ST. JEOR: SCORE: 1025.62

## 2020-05-11 NOTE — PROGRESS NOTES
"Oncology Rooming Note    May 11, 2020 1:58 PM   Paola Alicea is a 49 year old female who presents for:    Chief Complaint   Patient presents with     Oncology Clinic Visit     6 month recheck Personal history of malignant neoplasm of breast,      Initial Vitals: /67 (BP Location: Right arm, Patient Position: Sitting, Cuff Size: Adult Regular)   Pulse 92   Temp 98.8  F (37.1  C) (Tympanic)   Resp 16   Ht 1.492 m (4' 10.75\")   Wt 49.9 kg (110 lb)   LMP 08/07/2012   SpO2 97%   BMI 22.41 kg/m   Estimated body mass index is 22.41 kg/m  as calculated from the following:    Height as of this encounter: 1.492 m (4' 10.75\").    Weight as of this encounter: 49.9 kg (110 lb). Body surface area is 1.44 meters squared.  No Pain (0) Comment: Data Unavailable   Patient's last menstrual period was 08/07/2012.  Allergies reviewed: Yes  Medications reviewed: Yes    Medications: Medication refills not needed today.  Pharmacy name entered into [facility]: [id number] - [address]    Clinical concerns: 6 month recheck Personal history of malignant neoplasm of breast.   Itchiness bilaterally on chest x 2 weeks.       Brunilda Carter CMA              "

## 2020-05-11 NOTE — LETTER
"    5/11/2020         RE: Paola Alicea  [address]  [facility] [id number]        Dear Colleague,    Thank you for referring your patient, Paola Alicea, to the [facility]. Please see a copy of my visit note below.    Oncology Rooming Note    May 11, 2020 1:58 PM   Paola Alicea is a 49 year old female who presents for:    Chief Complaint   Patient presents with     Oncology Clinic Visit     6 month recheck Personal history of malignant neoplasm of breast,      Initial Vitals: /67 (BP Location: Right arm, Patient Position: Sitting, Cuff Size: Adult Regular)   Pulse 92   Temp 98.8  F (37.1  C) (Tympanic)   Resp 16   Ht 1.492 m (4' 10.75\")   Wt 49.9 kg (110 lb)   LMP 08/07/2012   SpO2 97%   BMI 22.41 kg/m   Estimated body mass index is 22.41 kg/m  as calculated from the following:    Height as of this encounter: 1.492 m (4' 10.75\").    Weight as of this encounter: 49.9 kg (110 lb). Body surface area is 1.44 meters squared.  No Pain (0) Comment: Data Unavailable   Patient's last menstrual period was 08/07/2012.  Allergies reviewed: Yes  Medications reviewed: Yes    Medications: Medication refills not needed today.  Pharmacy name entered into [facility]: [id number] - [address]    Clinical concerns: 6 month recheck Personal history of malignant neoplasm of breast.   Itchiness bilaterally on chest x 2 weeks.       Brunilda Carter CMA                ONCOLOGY FOLLOW UP VISIT       REASON FOR VISIT AND REASON FOR VISIT:  left breast cancer 2015, ER/CO-, Her 2 low +, s/p neoadjuvant chemo      HISTORY OF ONCOLOGY ILLNESS: She presented with a palpable mass in the left breast for 3 weeks at age 45.  Diagnostic mammogram in 06/2015 identified a 3 cm mass in the upper outer quadrant of the left breast.  This is around at 1 o'clock position, 9 cm from the nipple.  Biopsy indicating ER/CO negative, HER-2 low positive, FISH ratio 2.2, copy number was " "3.7.      Staging PET found hypermetabolic adenopathy in the mediastinum, bilateral hilar regions, left lateral posterior nasopharynx and oropharynx, small left axilla LN. EBUS by [facility] 8/2015 found non-necrotizing granulomas and negative for malignancy.     She was seen by Dr. Rodriguez and recommended neoadjuvant AC with wkly taxol , C1 AC 8/17/2015. Dr. Rodriguez requested further Her 2 testing at [place], who read is as \"+\".      Wkly T taxol 80 mg/m2, Pertuzumab and herceptin q 3 wks are offered after DD AC. She had severe diarrhea and hypkalemia, Pertuzumab was dropped after 2 cycles. She finished total 12 wks of wkly taxol and herceptin.     She had Left lumpectomy 3/1/2016 at [facility] to have 0.7 cm residual high grade IDC, TN, clear margin, 3 LNs are negative. Adjuvant herceptin is offered for 1 yr till 3/2016. S/p RT till 5/2016.       INTERVAL HISTORY:  Since the patient's last visit with us, there is no hospital stay/surgery/new diagnosis made.      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  She subsequently had knee problems on the left side, eventually required above knee amputation around 2011 through different steps of surgery.  Reflux disease, chronic pain syndrome, moderate major depression. Right hip replacement in 1/2018,     MEDICATIONS:  Reviewed in Epic system.      SOCIAL HISTORY:  She lives in [place].  She is not .  She has never been pregnant and she has no kids.  She lives by herself in an apartment.   She is very involved with community work.      FAMILY HISTORY:  No family history of breast or ovarian cancer or other cancer on the mother's side.  Father's side history is unreliable.        REVIEW OF SYSTEMS:   She is in her usual state of health continue taking gabapentin for her chronic leg pain post amputation.  Reports left breast pruritis in the last 2 wks.        PHYSICAL EXAMINATION:   GENERAL APPEARANCE:  A middle-aged woman, " "looks like her stated age.  She is very on top of her medical history.     ECOG 0    VITAL SIGNS:  Blood pressure 107/67, pulse 92, temperature 98.8  F (37.1  C), temperature source Tympanic, resp. rate 16, height 1.492 m (4' 10.75\"), weight 49.9 kg (110 lb), last menstrual period 08/07/2012, SpO2 97 %, not currently breastfeeding.  HEENT: The patient is normocephalic, atraumatic. Pupils are equally reactive to light.  Sclerae are anicteric.  Moist oral mucosa.  Negative pharynx.  No oral thrush.   NECK:  Supple.  No jugular venous distention.  Thyroid is not palpable.   LYMPH NODES:  Superficial lymphadenopathy is not appreciable in the bilateral cervical, supraclavicular, axillary or inguinal areas.   CARDIOVASCULAR:  S1, S2 regular with no murmurs or gallops.  No carotid or abdominal bruits.   PULMONARY:  Lungs are clear to auscultation and percussion bilaterally.  There is no wheezing or rhonchi.   GASTROINTESTINAL:  Abdomen is soft, nontender.  No hepatosplenomegaly.  No signs of ascites.  No mass appreciable.   MUSCULOSKELETAL/EXTREMITIES:  Left above the knee amputation for osteosarcoma  NEUROLOGIC:  Cranial nerves II-XII are grossly intact.  Sensation intact.  Muscle strength and muscle tone symmetrical, 5/5 throughout.   BACK:  No spinal or paraspinal tenderness.  No CVA tenderness.   SKIN:  No petechiae.  No rash.  No signs of cellulitis. Diffuse dry skin.  BREASTS: well healed lumpectomy scar wihtout edema or ecchymoses.          CURRENT LABORATORY DATA REVIEWED WITH PATIENT:    Cbc diff nl, ALT 62  B12 290+  CMP/marker are good.       Current Image Reviewed with patient today:  MA 8/2019 MA: negative      OLD DATA REVIEW WITH SUMMARY  CT 2/2018   1. Previously described mediastinal and hilar nodes are either stable or smaller, reference nodes as above.  2. No new pulmonary abnormality.    MA at [facility] 8/2017 - negative    CT body 3/2017: There is worsening or mediastinal and hilar adenopathy.  There also " is a new mildly prominent gastrohepatic ligament lymph node. She was referred to pulmonary at , tumor conference discussion no biopsy is recommended.     MA 9/2016 from [facility]: negative.     8/2015 EBUS at [facility] biopsy of station 4R, 11L, 7 - none malignancy, non-necrotizing granulomas.    BCRA 1/2 mutation from [facility] 10/2015: negative.     7/2015 [id number] baseline 52  Cbc diff/CMP are good.       PET 7/2015  1. Focal asymmetric hypermetabolism at the left lateral posterior nasopharynx and oropharynx, series 102 image 36 (SUV Max 9.4). There is no conspicuous mass lesion seen at this location.  2. Hypermetabolic adenopathy in the mediastinum, bilateral hilar regions, and suspected at the left axilla.   1)Small left axillary lymph node shows mild hypermetabolism and measures 1.6 x 1.0 cm series 3 image 104 (SUV Max 2.8). A few adjacent left axillary small lymph nodes has minimal elevated metabolism.  2) Left hilar adenopathy measures 1.3 x 0.8 cm, series 3 image 109 (SUV Max 9.3). Example of a right hilar adenopathy measures 1.7 x 1.1 cm, series 3 image 112 (SUV Max 11.3).   3) Example of mediastinal adenopathy at the subcarinal location measures 2.0 x 0.9 cm, series 3 image 115 (SUV Max 8.4). There are numerous additional examples.  3. Left lateral breast mass is 3.2 x 3.0 cm, series 3 image 103 (SUV Max 16.4). Trace pericardial fluid.  4. Hazy ground-glass opacities and some interstitial prominence could represent pulmonary edema or other alveolar infiltrate. There is a small pericardial effusion.         ASSESSMENT AND PLAN DISCUSSED WITH PATIENT TODAY:  1. She was diagnosed clinically T2 N1 MX  ER/MO -, Her 2 low + left breast upper outer quadrant of the left breast high-grade invasive ductal cancer 6/2015 s/p neoadjuvant S/p DD AC -- wkly taxol and herceptin partial pertuzumab (dropped due to severe diarrhea).   She had Left lumpectomy 3/1/2016 at [facility] to have 0.7 cm residual high grade IDC, TN,  clear margin, 3 LNs are negative. Adjuvant herceptin is offered for 1 yr till 3/2016. S/p RT till 5/2016.     She is on  follow up 6 months for now.     Brenda saw genetic at [facility] 8/25/2015, BRCA 1/2 tests was negative.    She will be 5 yrs out spring 2021.     2. On going neuropathy on toes.   She is on gabapentin. She reports this is better.        3. ROSSY mediastinum, bilateral hilar regions, left lateral posterior nasopharynx and oropharynx, small left axilla LN. EBUS by [facility] 8/2015 found non-necrotizing granulomas.   CT 3/2017 found worsening ROSSY in chest. Then improved in 2/2018.   Adviced pulmonary evaluation. She is not compliant on this.     4. New pruritis of left breast.  PE is negative. She has MA coming up.       Again, thank you for allowing me to participate in the care of your patient.        Sincerely,        Kelly Thomas MD, MD

## 2020-05-11 NOTE — PROGRESS NOTES
"ONCOLOGY FOLLOW UP VISIT       REASON FOR VISIT AND REASON FOR VISIT:  left breast cancer 2015, ER/TN-, Her 2 low +, s/p neoadjuvant chemo      HISTORY OF ONCOLOGY ILLNESS: She presented with a palpable mass in the left breast for 3 weeks at age 45.  Diagnostic mammogram in 06/2015 identified a 3 cm mass in the upper outer quadrant of the left breast.  This is around at 1 o'clock position, 9 cm from the nipple.  Biopsy indicating ER/TN negative, HER-2 low positive, FISH ratio 2.2, copy number was 3.7.      Staging PET found hypermetabolic adenopathy in the mediastinum, bilateral hilar regions, left lateral posterior nasopharynx and oropharynx, small left axilla LN. EBUS by [facility] 8/2015 found non-necrotizing granulomas and negative for malignancy.     She was seen by Dr. Rodriguez and recommended neoadjuvant AC with wkly taxol , C1 AC 8/17/2015. Dr. Rodriguez requested further Her 2 testing at [facility], who read is as \"+\".      Wkly T taxol 80 mg/m2, Pertuzumab and herceptin q 3 wks are offered after DD AC. She had severe diarrhea and hypkalemia, Pertuzumab was dropped after 2 cycles. She finished total 12 wks of wkly taxol and herceptin.     She had Left lumpectomy 3/1/2016 at [facility] to have 0.7 cm residual high grade IDC, TN, clear margin, 3 LNs are negative. Adjuvant herceptin is offered for 1 yr till 3/2016. S/p RT till 5/2016.       INTERVAL HISTORY:  Since the patient's last visit with us, there is no hospital stay/surgery/new diagnosis made.      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  She subsequently had knee problems on the left side, eventually required above knee amputation around 2011 through different steps of surgery.  Reflux disease, chronic pain syndrome, moderate major depression. Right hip replacement in 1/2018,     MEDICATIONS:  Reviewed in Epic system.      SOCIAL HISTORY:  She lives in [place].  She is not .  She has never been pregnant " "and she has no kids.  She lives by herself in an apartment.   She is very involved with community work.      FAMILY HISTORY:  No family history of breast or ovarian cancer or other cancer on the mother's side.  Father's side history is unreliable.        REVIEW OF SYSTEMS:   She is in her usual state of health continue taking gabapentin for her chronic leg pain post amputation.  Reports left breast pruritis in the last 2 wks.        PHYSICAL EXAMINATION:   GENERAL APPEARANCE:  A middle-aged woman, looks like her stated age.  She is very on top of her medical history.     ECOG 0    VITAL SIGNS:  Blood pressure 107/67, pulse 92, temperature 98.8  F (37.1  C), temperature source Tympanic, resp. rate 16, height 1.492 m (4' 10.75\"), weight 49.9 kg (110 lb), last menstrual period 08/07/2012, SpO2 97 %, not currently breastfeeding.  HEENT: The patient is normocephalic, atraumatic. Pupils are equally reactive to light.  Sclerae are anicteric.  Moist oral mucosa.  Negative pharynx.  No oral thrush.   NECK:  Supple.  No jugular venous distention.  Thyroid is not palpable.   LYMPH NODES:  Superficial lymphadenopathy is not appreciable in the bilateral cervical, supraclavicular, axillary or inguinal areas.   CARDIOVASCULAR:  S1, S2 regular with no murmurs or gallops.  No carotid or abdominal bruits.   PULMONARY:  Lungs are clear to auscultation and percussion bilaterally.  There is no wheezing or rhonchi.   GASTROINTESTINAL:  Abdomen is soft, nontender.  No hepatosplenomegaly.  No signs of ascites.  No mass appreciable.   MUSCULOSKELETAL/EXTREMITIES:  Left above the knee amputation for osteosarcoma  NEUROLOGIC:  Cranial nerves II-XII are grossly intact.  Sensation intact.  Muscle strength and muscle tone symmetrical, 5/5 throughout.   BACK:  No spinal or paraspinal tenderness.  No CVA tenderness.   SKIN:  No petechiae.  No rash.  No signs of cellulitis. Diffuse dry skin.  BREASTS: well healed lumpectomy scar wihtout edema or " ecchymoses.          CURRENT LABORATORY DATA REVIEWED WITH PATIENT:    Cbc diff nl, ALT 62  B12 290+  CMP/marker are good.       Current Image Reviewed with patient today:  MA 8/2019 MA: negative      OLD DATA REVIEW WITH SUMMARY  CT 2/2018   1. Previously described mediastinal and hilar nodes are either stable or smaller, reference nodes as above.  2. No new pulmonary abnormality.    MA at [facility] 8/2017 - negative    CT body 3/2017: There is worsening or mediastinal and hilar adenopathy.  There also is a new mildly prominent gastrohepatic ligament lymph node. She was referred to pulmonary at , tumor conference discussion no biopsy is recommended.     MA 9/2016 from [facility]: negative.     8/2015 EBUS at [facility] biopsy of station 4R, 11L, 7 - none malignancy, non-necrotizing granulomas.    BCRA 1/2 mutation from [facility] 10/2015: negative.     7/2015 [id number] baseline 52  Cbc diff/CMP are good.       PET 7/2015  1. Focal asymmetric hypermetabolism at the left lateral posterior nasopharynx and oropharynx, series 102 image 36 (SUV Max 9.4). There is no conspicuous mass lesion seen at this location.  2. Hypermetabolic adenopathy in the mediastinum, bilateral hilar regions, and suspected at the left axilla.   1)Small left axillary lymph node shows mild hypermetabolism and measures 1.6 x 1.0 cm series 3 image 104 (SUV Max 2.8). A few adjacent left axillary small lymph nodes has minimal elevated metabolism.  2) Left hilar adenopathy measures 1.3 x 0.8 cm, series 3 image 109 (SUV Max 9.3). Example of a right hilar adenopathy measures 1.7 x 1.1 cm, series 3 image 112 (SUV Max 11.3).   3) Example of mediastinal adenopathy at the subcarinal location measures 2.0 x 0.9 cm, series 3 image 115 (SUV Max 8.4). There are numerous additional examples.  3. Left lateral breast mass is 3.2 x 3.0 cm, series 3 image 103 (SUV Max 16.4). Trace pericardial fluid.  4. Hazy ground-glass opacities and some interstitial prominence could represent  pulmonary edema or other alveolar infiltrate. There is a small pericardial effusion.         ASSESSMENT AND PLAN DISCUSSED WITH PATIENT TODAY:  1. She was diagnosed clinically T2 N1 MX  ER/MD -, Her 2 low + left breast upper outer quadrant of the left breast high-grade invasive ductal cancer 6/2015 s/p neoadjuvant S/p DD AC -- wkly taxol and herceptin partial pertuzumab (dropped due to severe diarrhea).   She had Left lumpectomy 3/1/2016 at [facility] to have 0.7 cm residual high grade IDC, TN, clear margin, 3 LNs are negative. Adjuvant herceptin is offered for 1 yr till 3/2016. S/p RT till 5/2016.     She is on  follow up 6 months for now.     Brenda saw genetic at [facility] 8/25/2015, BRCA 1/2 tests was negative.    She will be 5 yrs out spring 2021.     2. On going neuropathy on toes.   She is on gabapentin. She reports this is better.        3. ROSSY mediastinum, bilateral hilar regions, left lateral posterior nasopharynx and oropharynx, small left axilla LN. EBUS by [facility] 8/2015 found non-necrotizing granulomas.   CT 3/2017 found worsening ROSSY in chest. Then improved in 2/2018.   Adviced pulmonary evaluation. She is not compliant on this.     4. New pruritis of left breast.  PE is negative. She has MA coming up.

## 2020-08-10 ENCOUNTER — HOSPITAL ENCOUNTER (OUTPATIENT)
Dept: MAMMOGRAPHY | Facility: CLINIC | Age: 50
Discharge: HOME | End: 2020-08-10
Attending: INTERNAL MEDICINE | Admitting: INTERNAL MEDICINE
Payer: COMMERCIAL

## 2020-08-10 DIAGNOSIS — Z12.31 SCREENING MAMMOGRAM, ENCOUNTER FOR: ICD-10-CM

## 2020-08-10 PROCEDURE — 77067 SCR MAMMO BI INCL CAD: CPT

## 2020-11-02 ENCOUNTER — HOSPITAL ENCOUNTER (OUTPATIENT)
Dept: LAB | Facility: CLINIC | Age: 50
Discharge: HOME | End: 2020-11-02
Attending: INTERNAL MEDICINE | Admitting: INTERNAL MEDICINE
Payer: COMMERCIAL

## 2020-11-02 DIAGNOSIS — Z85.3 PERSONAL HISTORY OF MALIGNANT NEOPLASM OF BREAST: ICD-10-CM

## 2020-11-02 LAB
ALBUMIN SERPL-MCNC: 3.8 G/DL (ref 3.4–5)
ALP SERPL-CCNC: 82 U/L (ref 40–150)
ALT SERPL W P-5'-P-CCNC: 25 U/L (ref 0–50)
ANION GAP SERPL CALCULATED.3IONS-SCNC: 2 MMOL/L (ref 3–14)
AST SERPL W P-5'-P-CCNC: 16 U/L (ref 0–45)
BASOPHILS # BLD AUTO: 0 10E9/L (ref 0–0.2)
BASOPHILS NFR BLD AUTO: 0.7 %
BILIRUB SERPL-MCNC: 0.3 MG/DL (ref 0.2–1.3)
BUN SERPL-MCNC: 7 MG/DL (ref 7–30)
CALCIUM SERPL-MCNC: 9.5 MG/DL (ref 8.5–10.1)
CANCER AG27-29 SERPL-ACNC: 20 U/ML (ref 0–39)
CHLORIDE SERPL-SCNC: 108 MMOL/L (ref 94–109)
CO2 SERPL-SCNC: 31 MMOL/L (ref 20–32)
CREAT SERPL-MCNC: 0.69 MG/DL (ref 0.52–1.04)
DIFFERENTIAL METHOD BLD: ABNORMAL
EOSINOPHIL # BLD AUTO: 0.1 10E9/L (ref 0–0.7)
EOSINOPHIL NFR BLD AUTO: 1.3 %
ERYTHROCYTE [DISTWIDTH] IN BLOOD BY AUTOMATED COUNT: 13 % (ref 10–15)
GFR SERPL CREATININE-BSD FRML MDRD: >90 ML/MIN/{1.73_M2}
GLUCOSE SERPL-MCNC: 96 MG/DL (ref 70–99)
HCT VFR BLD AUTO: 41.3 % (ref 35–47)
HGB BLD-MCNC: 13.8 G/DL (ref 11.7–15.7)
IMM GRANULOCYTES # BLD: 0 10E9/L (ref 0–0.4)
IMM GRANULOCYTES NFR BLD: 0.2 %
LYMPHOCYTES # BLD AUTO: 0.7 10E9/L (ref 0.8–5.3)
LYMPHOCYTES NFR BLD AUTO: 16 %
MCH RBC QN AUTO: 32.1 PG (ref 26.5–33)
MCHC RBC AUTO-ENTMCNC: 33.4 G/DL (ref 31.5–36.5)
MCV RBC AUTO: 96 FL (ref 78–100)
MONOCYTES # BLD AUTO: 0.5 10E9/L (ref 0–1.3)
MONOCYTES NFR BLD AUTO: 10 %
NEUTROPHILS # BLD AUTO: 3.2 10E9/L (ref 1.6–8.3)
NEUTROPHILS NFR BLD AUTO: 71.8 %
NRBC # BLD AUTO: 0 10*3/UL
NRBC BLD AUTO-RTO: 0 /100
PLATELET # BLD AUTO: 229 10E9/L (ref 150–450)
POTASSIUM SERPL-SCNC: 3.8 MMOL/L (ref 3.4–5.3)
PROT SERPL-MCNC: 7.8 G/DL (ref 6.8–8.8)
RBC # BLD AUTO: 4.3 10E12/L (ref 3.8–5.2)
SODIUM SERPL-SCNC: 141 MMOL/L (ref 133–144)
WBC # BLD AUTO: 4.5 10E9/L (ref 4–11)

## 2020-11-02 PROCEDURE — 85025 COMPLETE CBC W/AUTO DIFF WBC: CPT | Performed by: INTERNAL MEDICINE

## 2020-11-02 PROCEDURE — 80053 COMPREHEN METABOLIC PANEL: CPT | Performed by: INTERNAL MEDICINE

## 2020-11-02 PROCEDURE — 86300 IMMUNOASSAY TUMOR CA 15-3: CPT | Performed by: INTERNAL MEDICINE

## 2020-11-02 PROCEDURE — 36415 COLL VENOUS BLD VENIPUNCTURE: CPT | Performed by: INTERNAL MEDICINE

## 2020-11-09 ENCOUNTER — ONCOLOGY VISIT (OUTPATIENT)
Dept: ONCOLOGY | Facility: CLINIC | Age: 50
End: 2020-11-09
Attending: INTERNAL MEDICINE
Payer: COMMERCIAL

## 2020-11-09 VITALS
BODY MASS INDEX: 22.05 KG/M2 | DIASTOLIC BLOOD PRESSURE: 68 MMHG | SYSTOLIC BLOOD PRESSURE: 116 MMHG | OXYGEN SATURATION: 95 % | WEIGHT: 112.3 LBS | HEART RATE: 93 BPM | TEMPERATURE: 98.4 F | RESPIRATION RATE: 16 BRPM | HEIGHT: 60 IN

## 2020-11-09 DIAGNOSIS — G62.9 NEUROPATHY: ICD-10-CM

## 2020-11-09 DIAGNOSIS — D72.810 LYMPHOPENIA: ICD-10-CM

## 2020-11-09 DIAGNOSIS — Z85.3 PERSONAL HISTORY OF MALIGNANT NEOPLASM OF BREAST: Primary | ICD-10-CM

## 2020-11-09 DIAGNOSIS — Z12.31 SCREENING MAMMOGRAM, ENCOUNTER FOR: ICD-10-CM

## 2020-11-09 PROCEDURE — 99214 OFFICE O/P EST MOD 30 MIN: CPT | Performed by: INTERNAL MEDICINE

## 2020-11-09 PROCEDURE — G0463 HOSPITAL OUTPT CLINIC VISIT: HCPCS

## 2020-11-09 ASSESSMENT — PAIN SCALES - GENERAL: PAINLEVEL: NO PAIN (0)

## 2020-11-09 ASSESSMENT — MIFFLIN-ST. JEOR: SCORE: 1046.92

## 2020-11-09 NOTE — PROGRESS NOTES
"Oncology Rooming Note    November 9, 2020 10:45 AM   Paola Alicea is a 50 year old female who presents for:    Chief Complaint   Patient presents with     Oncology Clinic Visit     6 month return      Initial Vitals: /68 (BP Location: Left arm, Patient Position: Sitting, Cuff Size: Adult Regular)   Pulse 93   Temp 98.4  F (36.9  C) (Oral)   Resp 16   Ht 1.518 m (4' 11.75\")   Wt 50.9 kg (112 lb 4.8 oz)   LMP 08/07/2012   SpO2 95%   BMI 22.12 kg/m   Estimated body mass index is 22.12 kg/m  as calculated from the following:    Height as of this encounter: 1.518 m (4' 11.75\").    Weight as of this encounter: 50.9 kg (112 lb 4.8 oz). Body surface area is 1.46 meters squared.  No Pain (0) Comment: Data Unavailable   Patient's last menstrual period was 08/07/2012.  Allergies reviewed: Yes  Medications reviewed: Yes    Medications: Medication refills not needed today.  Pharmacy name entered into [facility]: [id number] - [address]    Clinical concerns:6 month return Personal history of malignant neoplasm of breast..    Irritation of skin where she had radiation.       Brunilda Carter CMA              "

## 2020-11-09 NOTE — LETTER
"    11/9/2020         RE: Paola Alicea  [address]  [facility] [address]        Dear Colleague,    Thank you for referring your patient, Paola Alicea, to the [facility]. Please see a copy of my visit note below.    Oncology Rooming Note    November 9, 2020 10:45 AM   Paola Alicea is a 50 year old female who presents for:    Chief Complaint   Patient presents with     Oncology Clinic Visit     6 month return      Initial Vitals: /68 (BP Location: Left arm, Patient Position: Sitting, Cuff Size: Adult Regular)   Pulse 93   Temp 98.4  F (36.9  C) (Oral)   Resp 16   Ht 1.518 m (4' 11.75\")   Wt 50.9 kg (112 lb 4.8 oz)   LMP 08/07/2012   SpO2 95%   BMI 22.12 kg/m   Estimated body mass index is 22.12 kg/m  as calculated from the following:    Height as of this encounter: 1.518 m (4' 11.75\").    Weight as of this encounter: 50.9 kg (112 lb 4.8 oz). Body surface area is 1.46 meters squared.  No Pain (0) Comment: Data Unavailable   Patient's last menstrual period was 08/07/2012.  Allergies reviewed: Yes  Medications reviewed: Yes    Medications: Medication refills not needed today.  Pharmacy name entered into [facility]: [id number] - [address]    Clinical concerns:6 month return Personal history of malignant neoplasm of breast..    Irritation of skin where she had radiation.       Brunilda Carter [facility]                ONCOLOGY FOLLOW UP VISIT       REASON FOR VISIT AND REASON FOR VISIT:  left breast cancer 2015, ER/TN-, Her 2 low +, s/p neoadjuvant chemo      HISTORY OF ONCOLOGY ILLNESS: She presented with a palpable mass in the left breast for 3 weeks at age 45.  Diagnostic mammogram in 06/2015 identified a 3 cm mass in the upper outer quadrant of the left breast.  This is around at 1 o'clock position, 9 cm from the nipple.  Biopsy indicating ER/TN negative, HER-2 low positive, FISH ratio 2.2, copy number was 3.7.      Staging " "PET found hypermetabolic adenopathy in the mediastinum, bilateral hilar regions, left lateral posterior nasopharynx and oropharynx, small left axilla LN. EBUS by [facility] 8/2015 found non-necrotizing granulomas and negative for malignancy.     She was seen by Dr. Rodriguez and recommended neoadjuvant AC with wkly taxol , C1 AC 8/17/2015. Dr. Rodriguez requested further Her 2 testing at [place], who read is as \"+\".      Wkly T taxol 80 mg/m2, Pertuzumab and herceptin q 3 wks are offered after DD AC. She had severe diarrhea and hypkalemia, Pertuzumab was dropped after 2 cycles. She finished total 12 wks of wkly taxol and herceptin.     She had Left lumpectomy 3/1/2016 at [facility] to have 0.7 cm residual high grade IDC, clear margin, 3 LNs are negative. Adjuvant herceptin is offered for 1 yr till 3/2016. S/p RT till 5/2016.       INTERVAL HISTORY:  Since the patient's last visit with us, there is no hospital stay/surgery/new diagnosis made.      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  She subsequently had knee problems on the left side, eventually required above knee amputation around 2011 through different steps of surgery.  Reflux disease, chronic pain syndrome, moderate major depression. Right hip replacement in 1/2018,     MEDICATIONS:  Reviewed in Epic system.      SOCIAL HISTORY:  She lives in [place].  She is not .  She has never been pregnant and she has no kids.  She lives by herself in an apartment.   She is very involved with community work.      FAMILY HISTORY:  No family history of breast or ovarian cancer or other cancer on the mother's side.  Father's side history is unreliable.        REVIEW OF SYSTEMS:   She is in her usual state of health continue taking gabapentin for her chronic leg pain post amputation.       PHYSICAL EXAMINATION:   GENERAL APPEARANCE:  A middle-aged woman, looks like her stated age.  She is very on top of her medical history. " "    ECOG 0    VITAL SIGNS:  Blood pressure 116/68, pulse 93, temperature 98.4  F (36.9  C), temperature source Oral, resp. rate 16, height 1.518 m (4' 11.75\"), weight 50.9 kg (112 lb 4.8 oz), last menstrual period 08/07/2012, SpO2 95 %, not currently breastfeeding.  HEENT: The patient is normocephalic, atraumatic. Pupils are equally reactive to light.  Sclerae are anicteric.  Moist oral mucosa.  Negative pharynx.  No oral thrush.   NECK:  Supple.  No jugular venous distention.  Thyroid is not palpable.   LYMPH NODES:  Superficial lymphadenopathy is not appreciable in the bilateral cervical, supraclavicular, axillary or inguinal areas.   CARDIOVASCULAR:  S1, S2 regular with no murmurs or gallops.  No carotid or abdominal bruits.   PULMONARY:  Lungs are clear to auscultation and percussion bilaterally.  There is no wheezing or rhonchi.   GASTROINTESTINAL:  Abdomen is soft, nontender.  No hepatosplenomegaly.  No signs of ascites.  No mass appreciable.   MUSCULOSKELETAL/EXTREMITIES:  Left above the knee amputation for osteosarcoma  NEUROLOGIC:  Cranial nerves II-XII are grossly intact.  Sensation intact.  Muscle strength and muscle tone symmetrical, 5/5 throughout.   BACK:  No spinal or paraspinal tenderness.  No CVA tenderness.   SKIN:  No petechiae.  No rash.  No signs of cellulitis. Diffuse dry skin.  BREASTS: well healed lumpectomy scar wihtout edema or ecchymoses.          CURRENT LABORATORY DATA REVIEWED WITH PATIENT:    Cbc diff nl, ALC 0.7  CMP/marker are good.       Current Image Reviewed with patient today:  MA 8/2020 MA: negative      OLD DATA REVIEW WITH SUMMARY  CT 2/2018   1. Previously described mediastinal and hilar nodes are either stable or smaller, reference nodes as above.  2. No new pulmonary abnormality.    MA at [facility] 8/2017 - negative    CT body 3/2017: There is worsening or mediastinal and hilar adenopathy.  There also is a new mildly prominent gastrohepatic ligament lymph node. She was " [facility], tumor conference discussion no biopsy is recommended.     MA 9/2016 from [facility]: negative.     8/2015 EBUS at [facility] biopsy of station 4R, 11L, 7 - none malignancy, non-necrotizing granulomas.    BCRA 1/2 mutation from [facility] 10/2015: negative.     7/2015 [id number] baseline 52  Cbc diff/CMP are good.       PET 7/2015  1. Focal asymmetric hypermetabolism at the left lateral posterior nasopharynx and oropharynx, series 102 image 36 (SUV Max 9.4). There is no conspicuous mass lesion seen at this location.  2. Hypermetabolic adenopathy in the mediastinum, bilateral hilar regions, and suspected at the left axilla.   1)Small left axillary lymph node shows mild hypermetabolism and measures 1.6 x 1.0 cm series 3 image 104 (SUV Max 2.8). A few adjacent left axillary small lymph nodes has minimal elevated metabolism.  2) Left hilar adenopathy measures 1.3 x 0.8 cm, series 3 image 109 (SUV Max 9.3). Example of a right hilar adenopathy measures 1.7 x 1.1 cm, series 3 image 112 (SUV Max 11.3).   3) Example of mediastinal adenopathy at the subcarinal location measures 2.0 x 0.9 cm, series 3 image 115 (SUV Max 8.4). There are numerous additional examples.  3. Left lateral breast mass is 3.2 x 3.0 cm, series 3 image 103 (SUV Max 16.4). Trace pericardial fluid.  4. Hazy ground-glass opacities and some interstitial prominence could represent pulmonary edema or other alveolar infiltrate. There is a small pericardial effusion.         ASSESSMENT AND PLAN DISCUSSED WITH PATIENT TODAY:  1. She was diagnosed clinically T2 N1 MX  ER/[place] -, Her 2 low + left breast upper outer quadrant of the left breast high-grade invasive ductal cancer 6/2015 s/p neoadjuvant S/p DD AC -- wkly taxol and herceptin partial pertuzumab (dropped due to severe diarrhea).   She had Left lumpectomy 3/1/2016 at [facility] to have 0.7 cm residual high grade IDC, clear margin, 3 LNs are negative. Adjuvant herceptin is offered for 1 yr till  3/2016. S/p RT till 5/2016.     She is on follow up 12 months for now. MA is due in Aug.     She saw genetic at [facility] 8/25/2015, BRCA 1/2 tests was negative.      2. On going neuropathy on toes.   She is to continue gabapentin. She reports this is better.        3. Lymphopenia.   Advice high dose vit C po.       Again, thank you for allowing me to participate in the care of your patient.        Sincerely,        Kelly Thomas MD, MD

## 2020-11-09 NOTE — PROGRESS NOTES
"ONCOLOGY FOLLOW UP VISIT       REASON FOR VISIT AND REASON FOR VISIT:  left breast cancer 2015, ER/TN-, Her 2 low +, s/p neoadjuvant chemo      HISTORY OF ONCOLOGY ILLNESS: She presented with a palpable mass in the left breast for 3 weeks at age 45.  Diagnostic mammogram in 06/2015 identified a 3 cm mass in the upper outer quadrant of the left breast.  This is around at 1 o'clock position, 9 cm from the nipple.  Biopsy indicating ER/TN negative, HER-2 low positive, FISH ratio 2.2, copy number was 3.7.      Staging PET found hypermetabolic adenopathy in the mediastinum, bilateral hilar regions, left lateral posterior nasopharynx and oropharynx, small left axilla LN. EBUS by [facility] 8/2015 found non-necrotizing granulomas and negative for malignancy.     She was seen by Dr. Rodriguez and recommended neoadjuvant AC with wkly taxol , C1 AC 8/17/2015. Dr. Rodriguez requested further Her 2 testing at [facility], who read is as \"+\".      Wkly T taxol 80 mg/m2, Pertuzumab and herceptin q 3 wks are offered after DD AC. She had severe diarrhea and hypkalemia, Pertuzumab was dropped after 2 cycles. She finished total 12 wks of wkly taxol and herceptin.     She had Left lumpectomy 3/1/2016 at [facility] to have 0.7 cm residual high grade IDC, clear margin, 3 LNs are negative. Adjuvant herceptin is offered for 1 yr till 3/2016. S/p RT till 5/2016.       INTERVAL HISTORY:  Since the patient's last visit with us, there is no hospital stay/surgery/new diagnosis made.      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  She subsequently had knee problems on the left side, eventually required above knee amputation around 2011 through different steps of surgery.  Reflux disease, chronic pain syndrome, moderate major depression. Right hip replacement in 1/2018,     MEDICATIONS:  Reviewed in Epic system.      SOCIAL HISTORY:  She lives in [place].  She is not .  She has never been pregnant and " "she has no kids.  She lives by herself in an apartment.   She is very involved with community work.      FAMILY HISTORY:  No family history of breast or ovarian cancer or other cancer on the mother's side.  Father's side history is unreliable.        REVIEW OF SYSTEMS:   She is in her usual state of health continue taking gabapentin for her chronic leg pain post amputation.       PHYSICAL EXAMINATION:   GENERAL APPEARANCE:  A middle-aged woman, looks like her stated age.  She is very on top of her medical history.     ECOG 0    VITAL SIGNS:  Blood pressure 116/68, pulse 93, temperature 98.4  F (36.9  C), temperature source Oral, resp. rate 16, height 1.518 m (4' 11.75\"), weight 50.9 kg (112 lb 4.8 oz), last menstrual period 08/07/2012, SpO2 95 %, not currently breastfeeding.  HEENT: The patient is normocephalic, atraumatic. Pupils are equally reactive to light.  Sclerae are anicteric.  Moist oral mucosa.  Negative pharynx.  No oral thrush.   NECK:  Supple.  No jugular venous distention.  Thyroid is not palpable.   LYMPH NODES:  Superficial lymphadenopathy is not appreciable in the bilateral cervical, supraclavicular, axillary or inguinal areas.   CARDIOVASCULAR:  S1, S2 regular with no murmurs or gallops.  No carotid or abdominal bruits.   PULMONARY:  Lungs are clear to auscultation and percussion bilaterally.  There is no wheezing or rhonchi.   GASTROINTESTINAL:  Abdomen is soft, nontender.  No hepatosplenomegaly.  No signs of ascites.  No mass appreciable.   MUSCULOSKELETAL/EXTREMITIES:  Left above the knee amputation for osteosarcoma  NEUROLOGIC:  Cranial nerves II-XII are grossly intact.  Sensation intact.  Muscle strength and muscle tone symmetrical, 5/5 throughout.   BACK:  No spinal or paraspinal tenderness.  No CVA tenderness.   SKIN:  No petechiae.  No rash.  No signs of cellulitis. Diffuse dry skin.  BREASTS: well healed lumpectomy scar wihtout edema or ecchymoses.          CURRENT LABORATORY DATA REVIEWED " WITH PATIENT:    Cbc diff nl, ALC 0.7  CMP/marker are good.       Current Image Reviewed with patient today:  MA 8/2020 MA: negative      OLD DATA REVIEW WITH SUMMARY  CT 2/2018   1. Previously described mediastinal and hilar nodes are either stable or smaller, reference nodes as above.  2. No new pulmonary abnormality.    MA at [facility] 8/2017 - negative    CT body 3/2017: There is worsening or mediastinal and hilar adenopathy.  There also is a new mildly prominent gastrohepatic ligament lymph node. She was referred to pulmonary at , tumor conference discussion no biopsy is recommended.     MA 9/2016 from [facility]: negative.     8/2015 EBUS at [facility] biopsy of station 4R, 11L, 7 - none malignancy, non-necrotizing granulomas.    BCRA 1/2 mutation from [facility] 10/2015: negative.     7/2015 [id number] baseline 52  Cbc diff/CMP are good.       PET 7/2015  1. Focal asymmetric hypermetabolism at the left lateral posterior nasopharynx and oropharynx, series 102 image 36 (SUV Max 9.4). There is no conspicuous mass lesion seen at this location.  2. Hypermetabolic adenopathy in the mediastinum, bilateral hilar regions, and suspected at the left axilla.   1)Small left axillary lymph node shows mild hypermetabolism and measures 1.6 x 1.0 cm series 3 image 104 (SUV Max 2.8). A few adjacent left axillary small lymph nodes has minimal elevated metabolism.  2) Left hilar adenopathy measures 1.3 x 0.8 cm, series 3 image 109 (SUV Max 9.3). Example of a right hilar adenopathy measures 1.7 x 1.1 cm, series 3 image 112 (SUV Max 11.3).   3) Example of mediastinal adenopathy at the subcarinal location measures 2.0 x 0.9 cm, series 3 image 115 (SUV Max 8.4). There are numerous additional examples.  3. Left lateral breast mass is 3.2 x 3.0 cm, series 3 image 103 (SUV Max 16.4). Trace pericardial fluid.  4. Hazy ground-glass opacities and some interstitial prominence could represent pulmonary edema or other alveolar infiltrate. There is a small  pericardial effusion.         ASSESSMENT AND PLAN DISCUSSED WITH PATIENT TODAY:  1. She was diagnosed clinically T2 N1 MX  ER/[place] -, Her 2 low + left breast upper outer quadrant of the left breast high-grade invasive ductal cancer 6/2015 s/p neoadjuvant S/p DD AC -- wkly taxol and herceptin partial pertuzumab (dropped due to severe diarrhea).   She had Left lumpectomy 3/1/2016 at [facility] to have 0.7 cm residual high grade IDC, clear margin, 3 LNs are negative. Adjuvant herceptin is offered for 1 yr till 3/2016. S/p RT till 5/2016.     She is on follow up 12 months for now. MA is due in Aug.     She saw genetic at [facility] 8/25/2015, BRCA 1/2 tests was negative.      2. On going neuropathy on toes.   She is to continue gabapentin. She reports this is better.        3. Lymphopenia.   Advice high dose vit C po.

## 2021-08-03 ENCOUNTER — HOSPITAL ENCOUNTER (EMERGENCY)
Facility: CLINIC | Age: 51
Discharge: HOME | End: 2021-08-03
Attending: NURSE PRACTITIONER | Admitting: NURSE PRACTITIONER
Payer: COMMERCIAL

## 2021-08-03 VITALS
SYSTOLIC BLOOD PRESSURE: 96 MMHG | RESPIRATION RATE: 14 BRPM | DIASTOLIC BLOOD PRESSURE: 60 MMHG | OXYGEN SATURATION: 99 % | TEMPERATURE: 99 F | WEIGHT: 98 LBS | HEART RATE: 76 BPM | BODY MASS INDEX: 19.3 KG/M2

## 2021-08-03 DIAGNOSIS — R06.2 WHEEZING: ICD-10-CM

## 2021-08-03 DIAGNOSIS — R05.9 COUGH: ICD-10-CM

## 2021-08-03 PROCEDURE — 99213 OFFICE O/P EST LOW 20 MIN: CPT | Performed by: NURSE PRACTITIONER

## 2021-08-03 PROCEDURE — G0463 HOSPITAL OUTPT CLINIC VISIT: HCPCS | Performed by: NURSE PRACTITIONER

## 2021-08-03 RX ORDER — GUAIFENESIN 600 MG/1
1200 TABLET, EXTENDED RELEASE ORAL 2 TIMES DAILY
Qty: 28 TABLET | Refills: 0 | Status: SHIPPED | OUTPATIENT
Start: 2021-08-03 | End: 2021-08-10

## 2021-08-03 RX ORDER — DEXAMETHASONE 2 MG/1
10 TABLET ORAL ONCE
Qty: 5 TABLET | Refills: 0 | Status: SHIPPED | OUTPATIENT
Start: 2021-08-03 | End: 2021-08-03

## 2021-08-03 RX ORDER — BENZONATATE 200 MG/1
200 CAPSULE ORAL 3 TIMES DAILY PRN
Qty: 30 CAPSULE | Refills: 0 | Status: SHIPPED | OUTPATIENT
Start: 2021-08-03

## 2021-08-03 RX ORDER — ALBUTEROL SULFATE 90 UG/1
2 AEROSOL, METERED RESPIRATORY (INHALATION) EVERY 6 HOURS PRN
Qty: 18 G | Refills: 0 | Status: SHIPPED | OUTPATIENT
Start: 2021-08-03
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
## 2021-08-03 NOTE — DISCHARGE INSTRUCTIONS
Start the dexamethasone today and take 5 tablets all at once.  This is a steroid to decrease inflammation and improve the airflow and decrease the cough and decrease the wheezing.  You are also prescribed albuterol inhaler and you may inhale 2 puffs into the lungs every 6 hours as needed for cough, shortness of breath, or wheezing.  May also use this prior to working out.  I have prescribed Tessalon Perles this is cough capsule you can take 1 capsule by mouth 3 times daily as needed for cough.  I have also recommended guaifenesin or Mucinex I recommend 1200 mg by mouth twice daily for 7 days.  I recommend follow-up on Friday with by primary care if there is no improvement in symptoms.  Return here to the emergency room if you have sudden worsening of symptoms.

## 2021-08-05 NOTE — PATIENT INSTRUCTIONS
Case Management Discharge Note      Final Note: skilled rehab    Provided Post Acute Provider List?: N/A  Provided Post Acute Provider Quality & Resource List?: N/A    Selected Continued Care - Discharged on 8/3/2021 Admission date: 7/28/2021 - Discharge disposition: Skilled Nursing Facility (DC - External)    Destination Coordination complete.    Service Provider Selected Services Address Phone Fax Patient Preferred    [facility]  Skilled Nursing [address] [place] [address] [phone] --          Durable Medical Equipment    No services have been selected for the patient.              Dialysis/Infusion    No services have been selected for the patient.              Home Medical Care    No services have been selected for the patient.              Therapy    No services have been selected for the patient.              Community Resources    No services have been selected for the patient.              Community & DME    No services have been selected for the patient.                Selected Continued Care - Prior Encounters Includes selections from prior encounters from 4/29/2021 to 8/3/2021    Discharged on 7/21/2021 Admission date: 7/18/2021 - Discharge disposition: Long Term Care (DC - External)    Destination     Service Provider Selected Services Address Phone Fax Patient Preferred    [facility]  Skilled Nursing [address] [place] [address] [phone] --                    Transportation Services  Private: Car    Final Discharge Disposition Code: 03 - skilled nursing facility (SNF)   Dr. Thomas is referring you to Pulmonology for known We would like to see you back in 4 months with labs. When you are in need of a refill, please call your pharmacy and they will send us a request.  Copy of appointments, and after visit summary (AVS) given to patient.  If you have any questions please call Elvira Márquez RN, BSN, OCN Oncology Hematology  [facility] [phone]. For questions after business hours, or on holidays/weekends, please call our after hours Nurse Triage line [phone]. Thank you.

## 2021-08-11 ENCOUNTER — HOSPITAL ENCOUNTER (OUTPATIENT)
Dept: MAMMOGRAPHY | Facility: CLINIC | Age: 51
Discharge: HOME | End: 2021-08-11
Attending: INTERNAL MEDICINE | Admitting: INTERNAL MEDICINE
Payer: COMMERCIAL

## 2021-08-11 DIAGNOSIS — Z12.31 SCREENING MAMMOGRAM, ENCOUNTER FOR: ICD-10-CM

## 2021-08-11 PROCEDURE — 77063 BREAST TOMOSYNTHESIS BI: CPT

## 2021-11-04 ENCOUNTER — LAB (OUTPATIENT)
Dept: LAB | Facility: CLINIC | Age: 51
End: 2021-11-04
Attending: INTERNAL MEDICINE
Payer: COMMERCIAL

## 2021-11-04 DIAGNOSIS — Z85.3 PERSONAL HISTORY OF MALIGNANT NEOPLASM OF BREAST: ICD-10-CM

## 2021-11-04 LAB
ALBUMIN SERPL-MCNC: 3.5 G/DL (ref 3.4–5)
ALP SERPL-CCNC: 82 U/L (ref 40–150)
ALT SERPL W P-5'-P-CCNC: 23 U/L (ref 0–50)
ANION GAP SERPL CALCULATED.3IONS-SCNC: 6 MMOL/L (ref 3–14)
AST SERPL W P-5'-P-CCNC: 23 U/L (ref 0–45)
BASOPHILS # BLD AUTO: 0 10E3/UL (ref 0–0.2)
BASOPHILS NFR BLD AUTO: 1 %
BILIRUB SERPL-MCNC: 0.4 MG/DL (ref 0.2–1.3)
BUN SERPL-MCNC: 8 MG/DL (ref 7–30)
CALCIUM SERPL-MCNC: 8.9 MG/DL (ref 8.5–10.1)
CANCER AG27-29 SERPL-ACNC: 16 U/ML (ref 0–39)
CHLORIDE BLD-SCNC: 107 MMOL/L (ref 94–109)
CO2 SERPL-SCNC: 29 MMOL/L (ref 20–32)
CREAT SERPL-MCNC: 0.54 MG/DL (ref 0.52–1.04)
EOSINOPHIL # BLD AUTO: 0.1 10E3/UL (ref 0–0.7)
EOSINOPHIL NFR BLD AUTO: 1 %
ERYTHROCYTE [DISTWIDTH] IN BLOOD BY AUTOMATED COUNT: 12.4 % (ref 10–15)
GFR SERPL CREATININE-BSD FRML MDRD: >90 ML/MIN/1.73M2
GLUCOSE BLD-MCNC: 87 MG/DL (ref 70–99)
HCT VFR BLD AUTO: 43 % (ref 35–47)
HGB BLD-MCNC: 14 G/DL (ref 11.7–15.7)
IMM GRANULOCYTES # BLD: 0 10E3/UL
IMM GRANULOCYTES NFR BLD: 0 %
LYMPHOCYTES # BLD AUTO: 0.6 10E3/UL (ref 0.8–5.3)
LYMPHOCYTES NFR BLD AUTO: 12 %
MCH RBC QN AUTO: 32.3 PG (ref 26.5–33)
MCHC RBC AUTO-ENTMCNC: 32.6 G/DL (ref 31.5–36.5)
MCV RBC AUTO: 99 FL (ref 78–100)
MONOCYTES # BLD AUTO: 0.5 10E3/UL (ref 0–1.3)
MONOCYTES NFR BLD AUTO: 9 %
NEUTROPHILS # BLD AUTO: 4.2 10E3/UL (ref 1.6–8.3)
NEUTROPHILS NFR BLD AUTO: 77 %
NRBC # BLD AUTO: 0 10E3/UL
NRBC BLD AUTO-RTO: 0 /100
PLATELET # BLD AUTO: 225 10E3/UL (ref 150–450)
POTASSIUM BLD-SCNC: 3.8 MMOL/L (ref 3.4–5.3)
PROT SERPL-MCNC: 7.9 G/DL (ref 6.8–8.8)
RBC # BLD AUTO: 4.33 10E6/UL (ref 3.8–5.2)
SODIUM SERPL-SCNC: 142 MMOL/L (ref 133–144)
WBC # BLD AUTO: 5.4 10E3/UL (ref 4–11)

## 2021-11-04 PROCEDURE — 85025 COMPLETE CBC W/AUTO DIFF WBC: CPT

## 2021-11-04 PROCEDURE — 36415 COLL VENOUS BLD VENIPUNCTURE: CPT

## 2021-11-04 PROCEDURE — 82040 ASSAY OF SERUM ALBUMIN: CPT

## 2021-11-04 PROCEDURE — 86300 IMMUNOASSAY TUMOR CA 15-3: CPT

## 2022-11-28 ENCOUNTER — HOSPITAL ENCOUNTER (EMERGENCY)
Facility: CLINIC | Age: 52
Discharge: HOME | End: 2022-11-28
Attending: FAMILY MEDICINE | Admitting: FAMILY MEDICINE
Payer: COMMERCIAL

## 2022-11-28 ENCOUNTER — APPOINTMENT (OUTPATIENT)
Dept: CT IMAGING | Facility: CLINIC | Age: 52
End: 2022-11-28
Attending: FAMILY MEDICINE
Payer: COMMERCIAL

## 2022-11-28 VITALS
BODY MASS INDEX: 19.3 KG/M2 | TEMPERATURE: 97 F | DIASTOLIC BLOOD PRESSURE: 68 MMHG | OXYGEN SATURATION: 99 % | SYSTOLIC BLOOD PRESSURE: 99 MMHG | WEIGHT: 98 LBS | RESPIRATION RATE: 16 BRPM | HEART RATE: 101 BPM

## 2022-11-28 DIAGNOSIS — R91.8 PULMONARY NODULES: ICD-10-CM

## 2022-11-28 DIAGNOSIS — N20.1 URETERAL STONE: ICD-10-CM

## 2022-11-28 DIAGNOSIS — K59.00 CONSTIPATION, UNSPECIFIED CONSTIPATION TYPE: ICD-10-CM

## 2022-11-28 LAB
ALBUMIN SERPL BCG-MCNC: 3.9 G/DL (ref 3.5–5.2)
ALBUMIN UR-MCNC: 30 MG/DL
ALP SERPL-CCNC: 91 U/L (ref 35–104)
ALT SERPL W P-5'-P-CCNC: 10 U/L (ref 10–35)
ANION GAP SERPL CALCULATED.3IONS-SCNC: 14 MMOL/L (ref 7–15)
APPEARANCE UR: CLEAR
AST SERPL W P-5'-P-CCNC: 21 U/L (ref 10–35)
BASOPHILS # BLD AUTO: 0 10E3/UL (ref 0–0.2)
BASOPHILS NFR BLD AUTO: 0 %
BILIRUB SERPL-MCNC: 0.5 MG/DL
BILIRUB UR QL STRIP: ABNORMAL
BUN SERPL-MCNC: 12.3 MG/DL (ref 6–20)
CALCIUM SERPL-MCNC: 9.3 MG/DL (ref 8.6–10)
CHLORIDE SERPL-SCNC: 102 MMOL/L (ref 98–107)
COLOR UR AUTO: YELLOW
CREAT SERPL-MCNC: 1.05 MG/DL (ref 0.51–0.95)
DEPRECATED HCO3 PLAS-SCNC: 22 MMOL/L (ref 22–29)
EOSINOPHIL # BLD AUTO: 0 10E3/UL (ref 0–0.7)
EOSINOPHIL NFR BLD AUTO: 0 %
ERYTHROCYTE [DISTWIDTH] IN BLOOD BY AUTOMATED COUNT: 12.4 % (ref 10–15)
GFR SERPL CREATININE-BSD FRML MDRD: 64 ML/MIN/1.73M2
GLUCOSE SERPL-MCNC: 85 MG/DL (ref 70–99)
GLUCOSE UR STRIP-MCNC: NEGATIVE MG/DL
HCT VFR BLD AUTO: 36.6 % (ref 35–47)
HGB BLD-MCNC: 12.2 G/DL (ref 11.7–15.7)
HGB UR QL STRIP: ABNORMAL
HOLD SPECIMEN: NORMAL
IMM GRANULOCYTES # BLD: 0 10E3/UL
IMM GRANULOCYTES NFR BLD: 1 %
KETONES UR STRIP-MCNC: 160 MG/DL
LEUKOCYTE ESTERASE UR QL STRIP: ABNORMAL
LIPASE SERPL-CCNC: 17 U/L (ref 13–60)
LYMPHOCYTES # BLD AUTO: 0.5 10E3/UL (ref 0.8–5.3)
LYMPHOCYTES NFR BLD AUTO: 6 %
MCH RBC QN AUTO: 32 PG (ref 26.5–33)
MCHC RBC AUTO-ENTMCNC: 33.3 G/DL (ref 31.5–36.5)
MCV RBC AUTO: 96 FL (ref 78–100)
MONOCYTES # BLD AUTO: 0.8 10E3/UL (ref 0–1.3)
MONOCYTES NFR BLD AUTO: 10 %
NEUTROPHILS # BLD AUTO: 7 10E3/UL (ref 1.6–8.3)
NEUTROPHILS NFR BLD AUTO: 83 %
NITRATE UR QL: NEGATIVE
NRBC # BLD AUTO: 0 10E3/UL
NRBC BLD AUTO-RTO: 0 /100
PH UR STRIP: 6 [PH] (ref 5–7)
PLATELET # BLD AUTO: 252 10E3/UL (ref 150–450)
POTASSIUM SERPL-SCNC: 3.7 MMOL/L (ref 3.4–5.3)
PROT SERPL-MCNC: 7.6 G/DL (ref 6.4–8.3)
RBC # BLD AUTO: 3.81 10E6/UL (ref 3.8–5.2)
SODIUM SERPL-SCNC: 138 MMOL/L (ref 136–145)
SP GR UR STRIP: 1.03 (ref 1–1.03)
UROBILINOGEN UR STRIP-MCNC: NORMAL MG/DL
WBC # BLD AUTO: 8.5 10E3/UL (ref 4–11)

## 2022-11-28 PROCEDURE — 82040 ASSAY OF SERUM ALBUMIN: CPT | Performed by: EMERGENCY MEDICINE

## 2022-11-28 PROCEDURE — 250N000011 HC RX IP 250 OP 636: Performed by: FAMILY MEDICINE

## 2022-11-28 PROCEDURE — 85025 COMPLETE CBC W/AUTO DIFF WBC: CPT | Performed by: FAMILY MEDICINE

## 2022-11-28 PROCEDURE — 80053 COMPREHEN METABOLIC PANEL: CPT | Performed by: EMERGENCY MEDICINE

## 2022-11-28 PROCEDURE — 83690 ASSAY OF LIPASE: CPT | Performed by: EMERGENCY MEDICINE

## 2022-11-28 PROCEDURE — 99284 EMERGENCY DEPT VISIT MOD MDM: CPT | Performed by: FAMILY MEDICINE

## 2022-11-28 PROCEDURE — 87086 URINE CULTURE/COLONY COUNT: CPT | Performed by: FAMILY MEDICINE

## 2022-11-28 PROCEDURE — 99285 EMERGENCY DEPT VISIT HI MDM: CPT | Mod: 25 | Performed by: FAMILY MEDICINE

## 2022-11-28 PROCEDURE — 81003 URINALYSIS AUTO W/O SCOPE: CPT | Performed by: FAMILY MEDICINE

## 2022-11-28 PROCEDURE — 36415 COLL VENOUS BLD VENIPUNCTURE: CPT | Performed by: EMERGENCY MEDICINE

## 2022-11-28 PROCEDURE — 81003 URINALYSIS AUTO W/O SCOPE: CPT | Performed by: EMERGENCY MEDICINE

## 2022-11-28 PROCEDURE — 83690 ASSAY OF LIPASE: CPT | Performed by: FAMILY MEDICINE

## 2022-11-28 PROCEDURE — 85014 HEMATOCRIT: CPT | Performed by: EMERGENCY MEDICINE

## 2022-11-28 PROCEDURE — 74177 CT ABD & PELVIS W/CONTRAST: CPT

## 2022-11-28 PROCEDURE — 96374 THER/PROPH/DIAG INJ IV PUSH: CPT | Mod: 59 | Performed by: FAMILY MEDICINE

## 2022-11-28 PROCEDURE — 250N000009 HC RX 250: Performed by: FAMILY MEDICINE

## 2022-11-28 PROCEDURE — 80053 COMPREHEN METABOLIC PANEL: CPT | Performed by: FAMILY MEDICINE

## 2022-11-28 RX ORDER — IOPAMIDOL 755 MG/ML
43 INJECTION, SOLUTION INTRAVASCULAR ONCE
Status: COMPLETED | OUTPATIENT
Start: 2022-11-28 | End: 2022-11-28

## 2022-11-28 RX ORDER — PROMETHAZINE HYDROCHLORIDE 25 MG/1
25 TABLET ORAL EVERY 6 HOURS PRN
Qty: 12 TABLET | Refills: 0 | Status: SHIPPED | OUTPATIENT
Start: 2022-11-28

## 2022-11-28 RX ORDER — KETOROLAC TROMETHAMINE 15 MG/ML
15 INJECTION, SOLUTION INTRAMUSCULAR; INTRAVENOUS ONCE
Status: COMPLETED | OUTPATIENT
Start: 2022-11-28 | End: 2022-11-28

## 2022-11-28 RX ADMIN — SODIUM CHLORIDE 52 ML: 9 INJECTION, SOLUTION INTRAVENOUS at 21:57

## 2022-11-28 RX ADMIN — IOPAMIDOL 43 ML: 755 INJECTION, SOLUTION INTRAVENOUS at 21:57

## 2022-11-28 RX ADMIN — KETOROLAC TROMETHAMINE 15 MG: 15 INJECTION, SOLUTION INTRAMUSCULAR; INTRAVENOUS at 21:13

## 2022-11-28 ASSESSMENT — ACTIVITIES OF DAILY LIVING (ADL)
ADLS_ACUITY_SCORE: 33
ADLS_ACUITY_SCORE: 35

## 2022-11-28 NOTE — ED TRIAGE NOTES
Ems arrived to triage with patient c/o left sided abd pain, hx of kidney stones. Pt has been vomiting. No BM for 3 days

## 2022-11-29 NOTE — DISCHARGE INSTRUCTIONS
Return to the Emergency Room if the following occurs:     Worsened pain, fever, nausea with vomiting and dehydration, or for any concern at anytime.    Or, follow-up with the following provider as we discussed:     A referral order to urology was placed at the time of your discharge.  Expect a phone call within the next 24 to 48 hours to help with scheduling.  If your pain resolves he did not have to follow-up with urology.    Medications discussed:    Ibuprofen 600mg every 6 hours for pain (7 days duration).  Tylenol 1000mg every 6 hours for pain (7 days duration).    Zofran for nausea, as needed.    MiraLax OTC.  Titrate the medicine to achieve a soft, easy stool every 1-2 days.    If you received pain-relieving or sedating medication during your time in the ER, avoid alcohol, driving automobiles, or working with machinery.  Also, a responsible adult must stay with you.      If you had X-rays or labs done we will attempt to contact you if there is a change needed in your care.      Call the Nurse Advice Line at [phone] or [phone] for any concern at anytime.

## 2022-11-29 NOTE — ED PROVIDER NOTES
"  HPI   The patient is a 52-year-old female presenting with left sided abdominal and flank pain.  Symptoms began on Saturday, 2 days ago.  Initially the pain was off and on but it is now constant.  She rates the pain is moderate in severity.  She has been vomiting multiple times a day since pain onset.  She feels bloated.  She does report constipation and says, \"I cannot remember the last time I had a BM.\"  Decreased appetite.  No fever.  No other known sick contacts.  No hematemesis or coffee-ground emesis.  No cough or congestion.  She has not had similar pain previously.  Known history of hysterectomy and cholecystectomy.  Known history of renal lithiasis on the right.        Allergies:  Allergies   Allergen Reactions     Ondansetron Other (See Comments)     Seizures, GTC     Dilaudid [Hydromorphone] Itching     Liquid Adhesive Other (See Comments)     Sensitive to paper tape only/redness     Morphine Sulfate Itching     No Clinical Screening - See Comments Other (See Comments)     Paper tape     Zofran Odt Other (See Comments)     \"20 minute [place] seizure\"     Darvocet [Propoxyphene N-Apap] Itching and Rash     Iodine Rash     Topical iodine --> rash.     Iodine I 131 Tositumomab Rash     Topical only     Morphine Rash and Itching     Percocet [Oxycodone-Acetaminophen] Itching and Rash     Tylenol W/Codeine [Acetaminophen-Codeine] Itching and Rash     Vicodin [Hydrocodone-Acetaminophen] Itching and Rash     Problem List:    Patient Active Problem List    Diagnosis Date Noted     Nausea 12/14/2015     Priority: Medium     Dehydration 12/14/2015     Priority: Medium     Diarrhea 11/10/2015     Priority: Medium     Hypokalemia 11/09/2015     Priority: Medium     Nausea with vomiting 10/05/2015     Priority: Medium     Drug-induced neutropenia (H) 08/31/2015     Priority: Medium     updating diagnosis code for icd10 cutover       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  Biopsy indicating ER/WA negative, HER-2 low positive, FISH ratio 2.2, copy number was 3.7.          Generalized muscle weakness 02/02/2014     Priority: Medium     Sinus tachycardia 02/02/2014     Priority: Medium     Osteosarcoma of bone (H) 01/27/2014     Priority: Medium     S/P above knee amputation (H) 01/27/2014     Priority: Medium     Chronic pain syndrome 01/16/2014     Priority: Medium     Esophageal reflux 01/16/2014     Priority: Medium     Migraine 01/16/2014     Priority: Medium     Problem list name updated by automated process. 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     Priority: Medium      Past Medical History:    Past Medical History:   Diagnosis Date     Breast cancer (H)      Depression      DUB (dysfunctional uterine bleeding) 4/6/2012     GERD (gastroesophageal reflux disease)      Osteosarcoma (H) 2003     Sinus tachycardia 2/2/2014     Tachycardia      Unilateral AKA (H)      Past Surgical History:    Past Surgical History:   Procedure Laterality Date     AMPUTATION  2004    stump revision     BIOPSY BREAST       COLONOSCOPY       DILATION AND CURETTAGE, HYSTEROSCOPY, ABLATE ENDOMETRIUM THERMACHOICE, COMBINED  5/29/2012    hysteroscopy, with uterine perforation     DISARTICULATE HIP  1/9/2012    Procedure:DISARTICULATE HIP; Left Hip Disarticulation; Surgeon:CORNELL BRASHER; [facility]     ESOPHAGOSCOPY, GASTROSCOPY, DUODENOSCOPY (EGD), COMBINED       EYE SURGERY       HYSTERECTOMY, PAP NO LONGER INDICATED      LAVH-BSO     LAPAROSCOPIC ASSISTED HYSTERECTOMY VAGINAL  8/21/2012    Procedure: LAPAROSCOPIC ASSISTED HYSTERECTOMY VAGINAL;  Laparoscopic assisted vaginal hysterectomy,left salpingo ophorectomy, possible abdominal  hysterectomy;  Surgeon: Britni Gil MD;  Location: [facility]     LAPAROSCOPIC CHOLECYSTECTOMY  9/15/2011    Procedure:LAPAROSCOPIC CHOLECYSTECTOMY; Laparoscopic Cholecystectomy; Surgeon:DEMETRIS AMAYA; Location:[facility]     LUMPECTOMY BREAST       ORTHOPEDIC SURGERY  2003    , left leg amputation, Hx knee replacement     right oopherectomy       Family History:    Family History   Problem Relation Age of Onset     Unknown/Adopted Mother      Unknown/Adopted Father      Other Cancer Father      Social History:  Marital Status:  Single [1]  Social History     Tobacco Use     Smoking status: Never     Smokeless tobacco: Never   Substance Use Topics     Alcohol use: No     Drug use: No      Medications:    promethazine (PHENERGAN) 25 MG tablet  acetaminophen (TYLENOL) 500 MG tablet  albuterol (PROAIR HFA/PROVENTIL HFA/VENTOLIN HFA) 108 (90 Base) MCG/ACT inhaler  ascorbic acid (VITAMIN C) 1000 MG TABS  benzonatate (TESSALON) 200 MG capsule  chlorhexidine (PERIDEX) 0.12 % solution  Cholecalciferol (VITAMIN D3) 35091 units TABS  Cyanocobalamin (VITAMIN B-12) 1000 MCG/15ML LIQD  dexamethasone (DECADRON) 2 MG tablet  gabapentin (NEURONTIN) 300 MG capsule  gabapentin (NEURONTIN) 600 MG tablet  ibuprofen (ADVIL,MOTRIN) 600 MG tablet  omeprazole (PRILOSEC) 20 MG capsule  order for DME  SF 1.1 % GEL topical gel      Review of Systems   All other systems reviewed and are negative.      PE   BP: 100/59  Pulse: 97  Temp: 97  F (36.1  C)  Resp: 16  Weight: 44.5 kg (98 lb)  SpO2: 99 %  Physical Exam  Vitals reviewed.   Constitutional:       General: She is not in acute distress.     Appearance: She is well-developed.   HENT:      Head: Normocephalic and atraumatic.      Right Ear: External ear normal.      Left Ear: External ear normal.      Nose: Nose normal.      Mouth/Throat:      Mouth: Mucous membranes are moist.      Pharynx: Oropharynx is clear.   Eyes:      Extraocular Movements: Extraocular movements intact.       Conjunctiva/sclera: Conjunctivae normal.      Pupils: Pupils are equal, round, and reactive to light.   Cardiovascular:      Rate and Rhythm: Normal rate and regular rhythm.   Pulmonary:      Effort: Pulmonary effort is normal.   Abdominal:      Comments: Tenderness along the left side.  Soft throughout.  No obvious distention.   Musculoskeletal:         General: Normal range of motion.      Cervical back: Normal range of motion.   Skin:     General: Skin is warm and dry.   Neurological:      Mental Status: She is alert and oriented to person, place, and time.   Psychiatric:         Behavior: Behavior normal.         ED COURSE and MDM   2109.  The patient has left-sided abdominal pain and tenderness.  Recent nausea and vomiting.  Constipated.  Lab values unremarkable.  Patient wants a CT scan of her abdomen and pelvis.  We did review the risks and benefits of this imaging modality and she is confident that this needs to be done in order to rule out severe pathology.    2258.  The patient has ureteral stone causing her pain.  The patient has constipation causing pain.  We talked about what to expect with these problems.  Urology referral order placed.  The patient has multiple allergies to both Zofran and narcotic medication.  She does not want prescriptions for these.  She does not want these here in the ED.  Phenergan prescription provided.  She wants to use ibuprofen and Tylenol for home.  MiraLAX recommended for constipation.  She is rating her pain 6/10 at the time of discharge.  She is asking to leave.  She seems comfortable.  Return to the ED for worsening as discussed specifically.  She understands.    2259.  The patient, their parent if applicable, and/or their medical decision maker(s) and I have reviewed all of the available historical information, applicable PMH, physical exam findings, and objective diagnostic data gathered during this ED visit.  We then discussed all work-up options and then together  agreed upon the course taken during this visit.  The ultimate disposition and plan was a cooperative decision made between myself and the patient, their parent if applicable, and/or their legal decision maker(s).  The risks and benefits of all decisions made during this visit were discussed to the best of my abilities given the circumstances, and all parties are understanding of the pertinent ramifications of these decisions.      LABS  Labs Ordered and Resulted from Time of ED Arrival to Time of ED Departure   COMPREHENSIVE METABOLIC PANEL - Abnormal       Result Value    Sodium 138      Potassium 3.7      Chloride 102      Carbon Dioxide (CO2) 22      Anion Gap 14      Urea Nitrogen 12.3      Creatinine 1.05 (*)     Calcium 9.3      Glucose 85      Alkaline Phosphatase 91      AST 21      ALT 10      Protein Total 7.6      Albumin 3.9      Bilirubin Total 0.5      GFR Estimate 64     URINE MACROSCOPIC WITH REFLEX TO MICRO - Abnormal    Color Urine Yellow      Appearance Urine Clear      Glucose Urine Negative      Bilirubin Urine Moderate (*)     Ketones Urine 160 (*)     Specific Gravity Urine 1.030      Blood Urine Moderate (*)     pH Urine 6.0      Protein Albumin Urine 30 (*)     Urobilinogen Urine Normal      Nitrite Urine Negative      Leukocyte Esterase Urine Trace (*)    CBC WITH PLATELETS AND DIFFERENTIAL - Abnormal    WBC Count 8.5      RBC Count 3.81      Hemoglobin 12.2      Hematocrit 36.6      MCV 96      MCH 32.0      MCHC 33.3      RDW 12.4      Platelet Count 252      % Neutrophils 83      % Lymphocytes 6      % Monocytes 10      % Eosinophils 0      % Basophils 0      % Immature Granulocytes 1      NRBCs per 100 WBC 0      Absolute Neutrophils 7.0      Absolute Lymphocytes 0.5 (*)     Absolute Monocytes 0.8      Absolute Eosinophils 0.0      Absolute Basophils 0.0      Absolute Immature Granulocytes 0.0      Absolute NRBCs 0.0     LIPASE - Normal    Lipase 17     URINE CULTURE       IMAGING   Images reviewed by me.  Radiology report also reviewed.  CT Abdomen Pelvis w Contrast   Final Result   IMPRESSION:    1.  Nodule in the left upper lobe measuring 0.6 cm with possible incompletely imaged nodule measuring 1 cm in the right lower lobe. Further evaluation with chest CT is recommended.   2.  Bilateral nonobstructing renal calculi and mild left hydroureteronephrosis due to a 0.6 cm stone just proximal to the UVJ.   3.  Large stool burden. Correlation for constipation.   4.  Similar appearance of likely thrombosis of the left external iliac artery and vein.      I REFERENCE:   Guidelines for Management of Incidental Pulmonary Nodules Detected on CT Images: From the Fleischner Society 2017.    Guidelines apply to incidental nodules in patients who are 35 years or older.   Guidelines do not apply to lung cancer screening, patients with immunosuppression, or patients with known primary cancer.      MULTIPLE NODULES   Nodule size <6 mm   Low-risk patients: No follow-up needed.   High-risk patients: Optional follow-up at 12 months.      Nodule size 6 mm or larger   Low-risk patients: Follow-up CT at 3-6 months, then consider CT at 18-24 months.   High-risk patients: Follow-up CT at 3-6 months, then at 18-24 months if no change.   -Use most suspicious nodule as guide to management.      Consider referral to lung nodule clinic.             Procedures    Medications   iopamidol (ISOVUE-370) solution 43 mL (43 mLs Intravenous Given 11/28/22 2157)   sodium chloride 0.9 % bag 500mL for CT scan flush use (52 mLs Intravenous Given 11/28/22 2157)   ketorolac (TORADOL) injection 15 mg (15 mg Intravenous Given 11/28/22 2113)         IMPRESSION       ICD-10-CM    1. Ureteral stone  N20.1 Adult Urology  Referral      2. Pulmonary nodules  R91.8 Primary Care Referral      3. Constipation, unspecified constipation type  K59.00 Primary Care Referral               Medication List      Started    promethazine 25 MG  tablet  Commonly known as: PHENERGAN  25 mg, Oral, EVERY 6 HOURS PRN                        Jose García MD  11/28/22 3868

## 2022-11-30 LAB — BACTERIA UR CULT: NORMAL

## 2022-11-30 NOTE — RESULT ENCOUNTER NOTE
Final urine culture report is negative.  Adult Negative Urine culture parameters per protocol: Any # Urogenital single or mixed organism, <10,000 col/ml single organism (cath/midstream), and > 3 organisms (No susceptibilities performed).  [facility] Emergency Dept discharge antibiotic prescribed (If applicable): None  Treatment recommendations per [facility] ED Lab Result Urine Culture protocol.

## 2023-06-06 ENCOUNTER — MEDICAL CORRESPONDENCE (OUTPATIENT)
Dept: HEALTH INFORMATION MANAGEMENT | Facility: CLINIC | Age: 53
End: 2023-06-06
Payer: COMMERCIAL

## 2024-04-13 ENCOUNTER — HOSPITAL ENCOUNTER (EMERGENCY)
Facility: CLINIC | Age: 54
Discharge: HOME | End: 2024-04-13
Attending: NURSE PRACTITIONER | Admitting: NURSE PRACTITIONER
Payer: COMMERCIAL

## 2024-04-13 ENCOUNTER — APPOINTMENT (OUTPATIENT)
Dept: GENERAL RADIOLOGY | Facility: CLINIC | Age: 54
End: 2024-04-13
Attending: NURSE PRACTITIONER
Payer: COMMERCIAL

## 2024-04-13 VITALS
RESPIRATION RATE: 22 BRPM | DIASTOLIC BLOOD PRESSURE: 50 MMHG | TEMPERATURE: 98.6 F | SYSTOLIC BLOOD PRESSURE: 108 MMHG | HEART RATE: 109 BPM | OXYGEN SATURATION: 98 %

## 2024-04-13 DIAGNOSIS — S80.01XA CONTUSION OF RIGHT KNEE, INITIAL ENCOUNTER: ICD-10-CM

## 2024-04-13 PROCEDURE — 73562 X-RAY EXAM OF KNEE 3: CPT | Mod: RT

## 2024-04-13 PROCEDURE — G0463 HOSPITAL OUTPT CLINIC VISIT: HCPCS

## 2024-04-13 PROCEDURE — 72170 X-RAY EXAM OF PELVIS: CPT

## 2024-04-13 PROCEDURE — 99213 OFFICE O/P EST LOW 20 MIN: CPT | Performed by: NURSE PRACTITIONER

## 2024-04-13 ASSESSMENT — COLUMBIA-SUICIDE SEVERITY RATING SCALE - C-SSRS
1. IN THE PAST MONTH, HAVE YOU WISHED YOU WERE DEAD OR WISHED YOU COULD GO TO SLEEP AND NOT WAKE UP?: NO
6. HAVE YOU EVER DONE ANYTHING, STARTED TO DO ANYTHING, OR PREPARED TO DO ANYTHING TO END YOUR LIFE?: NO
2. HAVE YOU ACTUALLY HAD ANY THOUGHTS OF KILLING YOURSELF IN THE PAST MONTH?: NO

## 2024-04-13 ASSESSMENT — ACTIVITIES OF DAILY LIVING (ADL): ADLS_ACUITY_SCORE: 35

## 2024-04-26 NOTE — ED PROVIDER NOTES
"ED Provider Note  [facility]      History     Chief Complaint   Patient presents with    Fall     Patient fell on Thursday at 3 am in the kitchen, left arm pain , amputated left side pain and right leg pain   Patient did not hit her head , Patient states she fell straight on to buttocks      HPI  Paola Alicea is a 53 year old female who reports falling onto her right knee during middle of the night while transferring and has pain that she reports is ongoing despite use of ice, heat, ibuprofen.  Request to have this x-rayed.  Also reports some pain around her stump of her left leg.  Denies any open areas of skin or stump or other obvious injuries.  Reports that she has a orthopedic provider who she has over the last years and reports that if she is needing further orthopedic care that she will follow-up with this provider.  Patient denies hitting her head, or losing consciousness.            Allergies:  Allergies   Allergen Reactions    Ondansetron Other (See Comments)     Seizures, GTC    Dilaudid [Hydromorphone] Itching    Liquid Adhesive Other (See Comments)     Sensitive to paper tape only/redness    Morphine Sulfate Itching    No Clinical Screening - See Comments Other (See Comments)     Paper tape    Ondansetron Other (See Comments)     \"[place] seizure\"    Darvocet [Propoxyphene N-Apap] Itching and Rash    Iodine Rash     Topical iodine --> rash.    Iodine I 131 Tositumomab Rash     Topical only    Morphine Rash and Itching    Percocet [Oxycodone-Acetaminophen] Itching and Rash    Tylenol W/Codeine [Acetaminophen-Codeine] Itching and Rash    Vicodin [Hydrocodone-Acetaminophen] Itching and Rash       Problem List:    Patient Active Problem List    Diagnosis Date Noted    Nausea 12/14/2015     Priority: Medium    Dehydration 12/14/2015     Priority: Medium    Diarrhea 11/10/2015     Priority: Medium    Hypokalemia 11/09/2015     Priority: Medium    Nausea with vomiting " 10/05/2015     Priority: Medium    Drug-induced neutropenia (H24) 08/31/2015     Priority: Medium     updating diagnosis code for icd10 cutover      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  Biopsy indicating ER/[place] negative, HER-2 low positive, FISH ratio 2.2, copy number was 3.7.         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     Priority: Medium        Past Medical History:    Past Medical History:   Diagnosis Date    Breast cancer (H)     Depression     DUB (dysfunctional uterine bleeding) 4/6/2012    GERD (gastroesophageal reflux disease)     Osteosarcoma (H) 2003    Sinus tachycardia 2/2/2014    Tachycardia     Unilateral AKA (H)        Past Surgical History:    Past Surgical History:   Procedure Laterality Date    AMPUTATION  2004    stump revision    BIOPSY BREAST      COLONOSCOPY      DILATION AND CURETTAGE, HYSTEROSCOPY, ABLATE ENDOMETRIUM THERMACHOICE, COMBINED  5/29/2012    hysteroscopy, with uterine perforation    DISARTICULATE HIP  1/9/2012    Procedure:DISARTICULATE HIP; Left Hip Disarticulation; Surgeon:CORNELL BRASHER; [facility]    ESOPHAGOSCOPY, GASTROSCOPY, DUODENOSCOPY (EGD), COMBINED      EYE SURGERY      HYSTERECTOMY, PAP NO LONGER INDICATED      LAVH-BSO     LAPAROSCOPIC ASSISTED HYSTERECTOMY VAGINAL  8/21/2012    Procedure: LAPAROSCOPIC ASSISTED HYSTERECTOMY VAGINAL;  Laparoscopic assisted vaginal hysterectomy,left salpingo ophorectomy, possible abdominal hysterectomy;  Surgeon: Britni Gil MD;  Location: [facility]    LAPAROSCOPIC CHOLECYSTECTOMY  9/15/2011    Procedure:LAPAROSCOPIC CHOLECYSTECTOMY; Laparoscopic Cholecystectomy; Surgeon:DEMETRIS AMAYA; Location:[facility]    LUMPECTOMY BREAST      ORTHOPEDIC SURGERY  2003    , left leg amputation, Hx knee replacement    right oopherectomy         Family History:    Family History   Problem Relation Age of Onset    Unknown/Adopted Mother     Unknown/Adopted Father     Other Cancer Father        Social History:  Marital Status:  Single [1]  Social History     Tobacco Use    Smoking status: Never    Smokeless tobacco: Never   Substance Use Topics    Alcohol use: No    Drug use: No        Medications:    acetaminophen (TYLENOL) 500 MG tablet  albuterol (PROAIR HFA/PROVENTIL HFA/VENTOLIN HFA) 108 (90 Base) MCG/ACT inhaler  ascorbic acid (VITAMIN C) 1000 MG TABS  benzonatate (TESSALON) 200 MG capsule  chlorhexidine (PERIDEX) 0.12 % solution  Cholecalciferol (VITAMIN D3) 54298 units TABS  Cyanocobalamin (VITAMIN B-12) 1000 MCG/15ML LIQD  dexamethasone (DECADRON) 2 MG tablet  gabapentin (NEURONTIN) 300 MG capsule  gabapentin (NEURONTIN) 600 MG tablet  ibuprofen (ADVIL,MOTRIN) 600 MG tablet  omeprazole (PRILOSEC) 20 MG capsule  order for DME  promethazine (PHENERGAN) 25 MG tablet  SF 1.1 % GEL topical gel          Review of Systems  A medically appropriate review of systems was performed with pertinent positives and negatives noted in the HPI, and all other systems negative.    Physical Exam   No data found.       Physical Exam  Musculoskeletal:        Legs:    General: alert and in no acute distress on arrival  Head: atraumatic, normocephalic  Lungs:  nonlabored, CTA bilateral throughout.  CV: Regular rate and rhythm,  extremities warm and perfused  Skin: no rashes, no diaphoresis and skin color normal  Neuro: Patient awake, alert, speech is fluent, appropriate historian.  Psychiatric: affect/mood normal, pleasant.      ED Course                 Procedures         pdated   Order    04/13/24 1731  XR Knee Right 3 Views  Performed: 04/13/24 1723  Final         Impression: IMPRESSION: No acute fracture. Mild lateral compartment joint space narrowing. No effusion. Small ovoid lucent focus distal femoral shaft is nonspecific but more likely incidental and benign. Consider radiog...      04/13/24 1729  XR Pelvis 1/2 Views  Performed: 04/13/24 1723  Final         Impression: IMPRESSION: Right total hip arthroplasty. No acute fracture or dislocation. 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 Left leg amputation at the hip joint. Diffuse bony deminer...                 No results found for this or any previous visit (from the past 24 hour(s)).    MEDICATIONS GIVEN IN THE EMERGENCY DEPARTMENT:  Medications - No data to display             Assessments & Plan (with Medical Decision Making)  53 year old female who presents to the Urgent Care for evaluation of recommended the use of ice, heat compression or elevation as needed.  And has an Ortho provider but she reports that she will but she reports that she will seek if  Her pain continues.  Personally reviewed x-rays, reviewed radiology interpretation both consistent with exam findings.     I have reviewed the nursing notes.    I have reviewed the findings, diagnosis, plan and need for follow up with the patient.        NEW PRESCRIPTIONS STARTED AT TODAY'S ER VISIT  Discharge Medication List as of 4/13/2024  5:43 PM          Final diagnoses:   Contusion of right knee, initial encounter       4/13/2024   [facility] EMERGENCY DEPT       Melisa Bone, LAZARO CNP  04/27/24 4729

## 2024-09-27 ENCOUNTER — PATIENT OUTREACH (OUTPATIENT)
Dept: ONCOLOGY | Facility: CLINIC | Age: 54
End: 2024-09-27
Payer: COMMERCIAL

## 2024-09-27 NOTE — PROGRESS NOTES
[facility]: Cancer Care                                                                                          Patient has not seen onc since 2020. Removed RNCHERYLE Lacy from pt care team.    Signature:  LEANDRA MCGRATH RN

## 2025-04-09 ENCOUNTER — HOSPITAL ENCOUNTER (EMERGENCY)
Facility: CLINIC | Age: 55
Discharge: HOME | End: 2025-04-09
Payer: COMMERCIAL

## 2025-04-09 VITALS
OXYGEN SATURATION: 94 % | TEMPERATURE: 98.4 F | DIASTOLIC BLOOD PRESSURE: 75 MMHG | SYSTOLIC BLOOD PRESSURE: 119 MMHG | RESPIRATION RATE: 18 BRPM | HEART RATE: 105 BPM

## 2025-04-09 DIAGNOSIS — M54.6 ACUTE LEFT-SIDED THORACIC BACK PAIN: ICD-10-CM

## 2025-04-09 PROCEDURE — G0463 HOSPITAL OUTPT CLINIC VISIT: HCPCS

## 2025-04-09 ASSESSMENT — ACTIVITIES OF DAILY LIVING (ADL): ADLS_ACUITY_SCORE: 41

## 2025-04-09 ASSESSMENT — COLUMBIA-SUICIDE SEVERITY RATING SCALE - C-SSRS
2. HAVE YOU ACTUALLY HAD ANY THOUGHTS OF KILLING YOURSELF IN THE PAST MONTH?: NO
1. IN THE PAST MONTH, HAVE YOU WISHED YOU WERE DEAD OR WISHED YOU COULD GO TO SLEEP AND NOT WAKE UP?: NO
6. HAVE YOU EVER DONE ANYTHING, STARTED TO DO ANYTHING, OR PREPARED TO DO ANYTHING TO END YOUR LIFE?: NO

## 2025-04-09 NOTE — ED PROVIDER NOTES
"  History     Chief Complaint   Patient presents with    Rib Pain     HPI  Paola Alicea is a 54 year old female with medical history significant for sarcoidosis who presents urgent care with concerns for rib fracture.  Patient reports she coughed very hard and felt a sharp pain in her left upper back.  She reports she has fractured her rib from coughing previously.  She is requesting x-rays for this today.  Denies chest pain or shortness of breath.    Allergies:  Allergies   Allergen Reactions    Ondansetron Other (See Comments)     Seizures, GTC    Dilaudid [Hydromorphone] Itching    Liquid Adhesive Other (See Comments)     Sensitive to paper tape only/redness    Morphine Sulfate Itching    No Clinical Screening - See Comments Other (See Comments)     Paper tape    Ondansetron Other (See Comments)     \"20 minute [place] seizure\"    Darvocet [Propoxyphene N-Apap] Itching and Rash    Iodine Rash     Topical iodine --> rash.    Iodine I 131 Tositumomab Rash     Topical only    Morphine Rash and Itching    Percocet [Oxycodone-Acetaminophen] Itching and Rash    Tylenol W/Codeine [Acetaminophen-Codeine] Itching and Rash    Vicodin [Hydrocodone-Acetaminophen] Itching and Rash       Problem List:    Patient Active Problem List    Diagnosis Date Noted    Nausea 12/14/2015     Priority: Medium    Dehydration 12/14/2015     Priority: Medium    Diarrhea 11/10/2015     Priority: Medium    Hypokalemia 11/09/2015     Priority: Medium    Nausea with vomiting 10/05/2015     Priority: Medium    Drug-induced neutropenia 08/31/2015     Priority: Medium     updating diagnosis code for icd10 cutover      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  Biopsy indicating ER/NM negative, HER-2 low positive, FISH ratio 2.2, copy number was 3.7.         Generalized muscle weakness 02/02/2014     Priority: Medium    Sinus tachycardia 02/02/2014     Priority: Medium    " Osteosarcoma of bone (H) 01/27/2014     Priority: Medium    S/P above knee amputation (H) 01/27/2014     Priority: Medium    Chronic pain syndrome 01/16/2014     Priority: Medium    Esophageal reflux 01/16/2014     Priority: Medium    Migraine 01/16/2014     Priority: Medium     Problem list name updated by automated process. 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     Priority: Medium        Past Medical History:    Past Medical History:   Diagnosis Date    Breast cancer (H)     Depression     DUB (dysfunctional uterine bleeding) 4/6/2012    GERD (gastroesophageal reflux disease)     Osteosarcoma (H) 2003    Sinus tachycardia 2/2/2014    Tachycardia     Unilateral AKA (H)        Past Surgical History:    Past Surgical History:   Procedure Laterality Date    AMPUTATION  2004    stump revision    BIOPSY BREAST      COLONOSCOPY      DILATION AND CURETTAGE, HYSTEROSCOPY, ABLATE ENDOMETRIUM THERMACHOICE, COMBINED  5/29/2012    hysteroscopy, with uterine perforation    DISARTICULATE HIP  1/9/2012    Procedure:DISARTICULATE HIP; Left Hip Disarticulation; Surgeon:CORNELL BRASHER; Location: OR    ESOPHAGOSCOPY, GASTROSCOPY, DUODENOSCOPY (EGD), COMBINED      EYE SURGERY      HYSTERECTOMY, PAP NO LONGER INDICATED      LAVH-BSO    LAPAROSCOPIC ASSISTED HYSTERECTOMY VAGINAL  8/21/2012    Procedure: LAPAROSCOPIC ASSISTED HYSTERECTOMY VAGINAL;  Laparoscopic assisted vaginal hysterectomy,left salpingo ophorectomy, possible abdominal hysterectomy;  Surgeon: Britni Gil MD;  Location: [facility] OR    LAPAROSCOPIC CHOLECYSTECTOMY  9/15/2011    Procedure:LAPAROSCOPIC CHOLECYSTECTOMY; Laparoscopic Cholecystectomy; Surgeon:DEMETRIS AMAYA; Location:[facility] OR    LUMPECTOMY BREAST      ORTHOPEDIC SURGERY  2003    , left  leg amputation, Hx knee replacement    right oopherectomy         Family History:    Family History   Problem Relation Age of Onset    Unknown/Adopted Mother     Unknown/Adopted Father     Other Cancer Father        Social History:  Marital Status:  Single [1]  Social History     Tobacco Use    Smoking status: Never    Smokeless tobacco: Never   Substance Use Topics    Alcohol use: No    Drug use: No        Medications:    acetaminophen (TYLENOL) 500 MG tablet  albuterol (PROAIR HFA/PROVENTIL HFA/VENTOLIN HFA) 108 (90 Base) MCG/ACT inhaler  ascorbic acid (VITAMIN C) 1000 MG TABS  benzonatate (TESSALON) 200 MG capsule  chlorhexidine (PERIDEX) 0.12 % solution  Cholecalciferol (VITAMIN D3) 98637 units TABS  Cyanocobalamin (VITAMIN B-12) 1000 MCG/15ML LIQD  dexamethasone (DECADRON) 2 MG tablet  gabapentin (NEURONTIN) 300 MG capsule  gabapentin (NEURONTIN) 600 MG tablet  ibuprofen (ADVIL,MOTRIN) 600 MG tablet  omeprazole (PRILOSEC) 20 MG capsule  order for DME  promethazine (PHENERGAN) 25 MG tablet  SF 1.1 % GEL topical gel          Review of Systems   All other systems reviewed and are negative.      Physical Exam   BP: 119/75  Pulse: 105  Temp: 98.4  F (36.9  C)  Resp: 18  SpO2: 94 %      Physical Exam  Vitals and nursing note reviewed.   Constitutional:       General: She is not in acute distress.     Appearance: Normal appearance. She is not ill-appearing or toxic-appearing.   HENT:      Head: Normocephalic.   Cardiovascular:      Rate and Rhythm: Normal rate and regular rhythm.      Pulses: Normal pulses.      Heart sounds: Normal heart sounds.   Pulmonary:      Effort: Pulmonary effort is normal. No respiratory distress.      Breath sounds: No stridor. No wheezing, rhonchi or rales.   Musculoskeletal:      Comments: Tenderness to palpation over left upper back.   Neurological:      Mental Status: She is alert.   Psychiatric:         Mood and Affect: Mood normal.         Behavior: Behavior normal.         ED Course         Procedures           Results for orders placed or performed during the hospital encounter of 04/09/25 (from the past 24 hours)   Ribs XR, unilat 3 views + PA chest,  left    Narrative    EXAM: XR RIBS AND CHEST LEFT 3 VIEWS  LOCATION: [facility]  DATE: 4/9/2025    INDICATION: left posterior rib pain near scapula   pt concerned she fractured a rib  COMPARISON: None.      Impression    IMPRESSION: The visualized heart and lungs are negative. Surgical clips in the right axilla. No acute rib fractures. Healed fractures of the left fourth through seventh ribs.       Medications - No data to display    Assessments & Plan (with Medical Decision Making)     I have reviewed the nursing notes.    I have reviewed the findings, diagnosis, plan and need for follow up with the patient.      Medical Decision Making    54 year old female with medical history significant for sarcoidosis who presents urgent care with concerns for rib fracture.  Patient reports she coughed very hard and felt a sharp pain in her left upper back.  She reports she has fractured her rib from coughing previously.  She is requesting x-rays for this today.  Denies chest pain or shortness of breath.      Exam above.  Patient in no acute distress and lungs CTAB.  SpO2 94% on room air.  There is tenderness to palpation of left upper back.    Left rib x-ray obtained and personally interpreted revealing:The visualized heart and lungs are negative. Surgical clips in the right axilla. No acute rib fractures. Healed fractures of the left fourth through seventh ribs.     Patient reassured today.  Pain is most likely due to muscle strain.  Plan:Relative rest, activity only as tolerated by pain  Ice 15-20 minutes 3-4 times daily  Follow up with PCP or sports medicine if no improvement in 7 days or sooner if new or worsening symptoms    Prior to making a final disposition on this patient the results of patient's tests and other  diagnostic studies were discussed with the patient. All questions were answered. Patient expressed understanding of the plan and was amenable to it.     Disclaimer: This note consists of symbols derived from keyboarding, dictation and/or voice recognition software. As a result, there may be errors in the script that have gone undetected. Please consider this when interpreting information found in this chart.        Discharge Medication List as of 4/9/2025  1:03 PM          Final diagnoses:   Acute left-sided thoracic back pain       4/9/2025   [facility] EMERGENCY DEPT       Stacia [place] PA-C  04/09/25 [id number]

## 2025-04-09 NOTE — DISCHARGE INSTRUCTIONS
Relative rest, activity only as tolerated by pain  Ice 15-20 minutes 3-4 times daily  Follow up with PCP or sports medicine if no improvement in 7 days or sooner if new or worsening symptoms

## 2025-07-08 ENCOUNTER — TRANSCRIBE ORDERS (OUTPATIENT)
Dept: OTHER | Age: 55
End: 2025-07-08

## 2025-07-08 ENCOUNTER — PATIENT OUTREACH (OUTPATIENT)
Dept: ONCOLOGY | Facility: CLINIC | Age: 55
End: 2025-07-08
Payer: COMMERCIAL

## 2025-07-08 ENCOUNTER — MEDICAL CORRESPONDENCE (OUTPATIENT)
Dept: HEALTH INFORMATION MANAGEMENT | Facility: CLINIC | Age: 55
End: 2025-07-08
Payer: COMMERCIAL

## 2025-07-08 DIAGNOSIS — C49.9 SARCOMA (H): ICD-10-CM

## 2025-07-08 DIAGNOSIS — C50.912 MALIGNANT NEOPLASM OF LEFT FEMALE BREAST, UNSPECIFIED ESTROGEN RECEPTOR STATUS, UNSPECIFIED SITE OF BREAST (H): Primary | ICD-10-CM

## 2025-07-08 NOTE — PROGRESS NOTES
"New Patient Oncology Nurse Navigator Note     Referring provider: Makenna Lee DO      Referring Clinic/Organization: [facility]     Referred to (specialty:) Medical Oncology      Date Referral Received: July 8, 2025     Evaluation for:  C50.912 (ICD-10-CM) - Malignant neoplasm of left female breast, unspecified estrogen receptor status, unspecified site of breast (H)     Referral also comments \"Sarcoidosis - Dr. Deepak Stern - [facility]\" ([facility])     Clinical History (per Nurse review of records provided):      Paola Alicea is a 55 year old female with a history of left breast cancer. She presented with a palpable mass in the left breast for 3 weeks at age 45.  Diagnostic mammogram in 06/2015 identified a 3 cm mass in the upper outer quadrant of the left breast.  This is around at 1 o'clock position, 9 cm from the nipple.  Biopsy indicating ER/OH negative, HER-2 low positive, FISH ratio 2.2, copy number was 3.7.       Staging PET found hypermetabolic adenopathy in the mediastinum, bilateral hilar regions, left lateral posterior nasopharynx and oropharynx, small left axilla LN. EBUS by [facility] 8/2015 found non-necrotizing granulomas and negative for malignancy.      She was seen by Dr. Rodriguez and recommended neoadjuvant AC with wkly taxol , C1 AC 8/17/2015. Dr. Rodriguez requested further HER2 testing at [place], who read is as \"+\".      Wkly T taxol 80 mg/m2, Pertuzumab and herceptin q 3 wks are offered after DD AC. She had severe diarrhea and hypkalemia, Pertuzumab was dropped after 2 cycles. She finished total 12 wks of wkly taxol and herceptin.      She had Left lumpectomy 3/1/2016 at [facility] and was found to have 0.7 cm residual high grade IDC, clear margin, 3 LNs are negative. Adjuvant herceptin offered for 1 yr till 3/2016.     S/p RT till 5/2016 with Dr. Hayden.      7/9 1330 - Telephoned and spoke with Paola. Explained my role and purpose for the call. There is nothing " new or concerning in her current breast or overall health per patient. She is unclear why Dr. Lee is referring her for medical oncology follow up for breast cancer. Patient offered to reach out to Dr. Lee's office and will call me when she has gained provider input.    Records Location: Care Everywhere, Media, and See Bookmarked material

## 2025-07-29 ENCOUNTER — HOSPITAL ENCOUNTER (OUTPATIENT)
Dept: CT IMAGING | Facility: CLINIC | Age: 55
Discharge: HOME | End: 2025-07-29
Attending: FAMILY MEDICINE
Payer: COMMERCIAL

## 2025-07-29 DIAGNOSIS — D86.0 SARCOIDOSIS, LUNG: ICD-10-CM

## 2025-07-29 DIAGNOSIS — J45.20 MILD INTERMITTENT ASTHMA WITHOUT COMPLICATION: ICD-10-CM

## 2025-07-29 PROCEDURE — 71250 CT THORAX DX C-: CPT

## 2025-08-19 ENCOUNTER — PRE VISIT (OUTPATIENT)
Dept: ONCOLOGY | Facility: HOSPITAL | Age: 55
End: 2025-08-19
Payer: COMMERCIAL